# Patient Record
Sex: FEMALE | Race: WHITE | NOT HISPANIC OR LATINO | Employment: FULL TIME | URBAN - METROPOLITAN AREA
[De-identification: names, ages, dates, MRNs, and addresses within clinical notes are randomized per-mention and may not be internally consistent; named-entity substitution may affect disease eponyms.]

---

## 2021-07-15 ENCOUNTER — OFFICE VISIT (OUTPATIENT)
Dept: OBGYN | Facility: CLINIC | Age: 56
End: 2021-07-15
Payer: COMMERCIAL

## 2021-07-15 VITALS
SYSTOLIC BLOOD PRESSURE: 92 MMHG | WEIGHT: 141 LBS | DIASTOLIC BLOOD PRESSURE: 52 MMHG | BODY MASS INDEX: 20.88 KG/M2 | HEIGHT: 69 IN

## 2021-07-15 DIAGNOSIS — N95.2 VAGINAL ATROPHY: ICD-10-CM

## 2021-07-15 DIAGNOSIS — Z01.419 ENCOUNTER FOR GYNECOLOGICAL EXAMINATION WITHOUT ABNORMAL FINDING: Primary | ICD-10-CM

## 2021-07-15 DIAGNOSIS — Z12.4 ENCOUNTER FOR SCREENING FOR CERVICAL CANCER: ICD-10-CM

## 2021-07-15 PROCEDURE — 87624 HPV HI-RISK TYP POOLED RSLT: CPT | Performed by: NURSE PRACTITIONER

## 2021-07-15 PROCEDURE — 99386 PREV VISIT NEW AGE 40-64: CPT | Performed by: NURSE PRACTITIONER

## 2021-07-15 PROCEDURE — G0145 SCR C/V CYTO,THINLAYER,RESCR: HCPCS | Performed by: NURSE PRACTITIONER

## 2021-07-15 RX ORDER — LEVOTHYROXINE SODIUM 100 UG/1
TABLET ORAL
COMMUNITY
Start: 2021-07-13

## 2021-07-15 RX ORDER — CROMOLYN SODIUM 5.2 MG
1 AEROSOL, SPRAY WITH PUMP (ML) NASAL
COMMUNITY
Start: 2019-11-07 | End: 2021-08-19

## 2021-07-15 RX ORDER — KETOCONAZOLE 20 MG/ML
SHAMPOO TOPICAL
COMMUNITY
Start: 2020-04-30

## 2021-07-15 RX ORDER — LORAZEPAM 1 MG/1
TABLET ORAL AT BEDTIME
COMMUNITY
Start: 2021-04-30

## 2021-07-15 RX ORDER — MULTIPLE VITAMINS W/ MINERALS TAB 9MG-400MCG
1 TAB ORAL
COMMUNITY
End: 2024-05-20

## 2021-07-15 RX ORDER — ONDANSETRON 8 MG/1
8 TABLET, FILM COATED ORAL
COMMUNITY
Start: 2020-12-15

## 2021-07-15 RX ORDER — TRIAMCINOLONE ACETONIDE 1 MG/ML
LOTION TOPICAL
COMMUNITY
Start: 2020-05-05

## 2021-07-15 RX ORDER — CITALOPRAM HYDROBROMIDE 40 MG/1
TABLET ORAL
COMMUNITY
Start: 2020-08-19

## 2021-07-15 RX ORDER — ZOLMITRIPTAN 5 MG/1
TABLET, ORALLY DISINTEGRATING ORAL
COMMUNITY
Start: 2021-02-03

## 2021-07-15 RX ORDER — TRETINOIN 1 MG/G
GEL TOPICAL
COMMUNITY
Start: 2020-08-17

## 2021-07-15 ASSESSMENT — ANXIETY QUESTIONNAIRES
2. NOT BEING ABLE TO STOP OR CONTROL WORRYING: NOT AT ALL
3. WORRYING TOO MUCH ABOUT DIFFERENT THINGS: NOT AT ALL
GAD7 TOTAL SCORE: 0
7. FEELING AFRAID AS IF SOMETHING AWFUL MIGHT HAPPEN: NOT AT ALL
5. BEING SO RESTLESS THAT IT IS HARD TO SIT STILL: NOT AT ALL
6. BECOMING EASILY ANNOYED OR IRRITABLE: NOT AT ALL
1. FEELING NERVOUS, ANXIOUS, OR ON EDGE: NOT AT ALL

## 2021-07-15 ASSESSMENT — PATIENT HEALTH QUESTIONNAIRE - PHQ9
SUM OF ALL RESPONSES TO PHQ QUESTIONS 1-9: 0
5. POOR APPETITE OR OVEREATING: NOT AT ALL

## 2021-07-15 ASSESSMENT — MIFFLIN-ST. JEOR: SCORE: 1293.95

## 2021-07-15 NOTE — PROGRESS NOTES
Colette is a 55 year old No obstetric history on file. female who presents for annual exam.     Besides routine health maintenance, she has no other health concerns today.    HPI:  The patient's PCP is No primary care provider on file.  New patient to me here today for her annual GYN exam.  She has not had an exam in a number of years.  She was diagnosed with invasive ductal carcinoma of the right breast.  She has had numerous surgeries last being 2017.  She is postmenopausal and denies any vaginal bleeding.  She does have vaginal dryness and pain with intercourse.  She is not on any hormone replacement therapy at the level of the vagina.      GYNECOLOGIC HISTORY:    No LMP recorded. Patient is postmenopausal.    Her current contraception method is: menopause.  She  reports that she has never smoked. She has never used smokeless tobacco.    Patient is sexually active.  STD testing offered?  Declined    Last PHQ-9 score on record = No flowsheet data found.  Last GAD7 score on record = No flowsheet data found.  Alcohol Score = 2    HEALTH MAINTENANCE:  Cholesterol:   Last Mammo: Not applicable,  Next Mammo: Breast exams with oncologist  Pap:   Colonoscopy: , Result: Normal, 10 years, Next Colonoscopy:   Dexa:  N/A    Health maintenance updated:  no    HISTORY:  OB History    Para Term  AB Living   3 3 3 0 0 3   SAB TAB Ectopic Multiple Live Births   0 0 0 0 0      # Outcome Date GA Lbr Lazaro/2nd Weight Sex Delivery Anes PTL Lv   3 Term            2 Term            1 Term                There is no problem list on file for this patient.    Past Surgical History:   Procedure Laterality Date     C/SECTION, CLASSICAL        Social History     Tobacco Use     Smoking status: Never Smoker     Smokeless tobacco: Never Used   Substance Use Topics     Alcohol use: Not on file      Problem (# of Occurrences) Relation (Name,Age of Onset)    Diabetes (1) Mother            Current Outpatient Medications  "  Medication Sig     citalopram (CELEXA) 40 MG tablet TAKE 1 TABLET(40 MG) BY MOUTH EVERY DAY     cromolyn sodium (NASALCROM) 5.2 MG/ACT nasal aerosol Spray 1 spray in nostril     ketoconazole (NIZORAL) 2 % external shampoo      levothyroxine (SYNTHROID/LEVOTHROID) 100 MCG tablet TAKE 1 TABLET(100 MCG) BY MOUTH EVERY DAY     LORazepam (ATIVAN) 1 MG tablet TAKE 1 TABLET(1 MG) BY MOUTH AT BEDTIME AS NEEDED     multivitamin w/minerals (MULTI-VITAMIN) tablet Take 1 tablet by mouth     ondansetron (ZOFRAN) 8 MG tablet Take 8 mg by mouth     tretinoin microspheres (RETIN-A MICRO) 0.1 % external gel APPLY EXTERNALLY TO THE AFFECTED AREA EVERY DAY     triamcinolone (KENALOG) 0.1 % external lotion      ZOLMitriptan (ZOMIG-ZMT) 5 MG ODT DISSOLVE ONE TABLET ON TONGUE AT ONSET OF HEADACHE. MAY REPEAT AFTER TWO HOURS     No current facility-administered medications for this visit.     Allergies   Allergen Reactions     Other Environmental Allergy      Trees and weeds     Zolpidem      confusion       Past medical, surgical, social and family histories were reviewed and updated in EPIC.    ROS:   12 point review of systems negative other than symptoms noted below or in the HPI.  No urinary frequency or dysuria, bladder or kidney problems    EXAM:  BP 92/52   Ht 1.753 m (5' 9\")   Wt 64 kg (141 lb)   BMI 20.82 kg/m     BMI: Body mass index is 20.82 kg/m .    PHYSICAL EXAM:  Constitutional:   Appearance: Well nourished, well developed, alert, in no acute distress  Neck:  Lymph Nodes:  No lymphadenopathy present    Thyroid:  Gland size normal, nontender, no nodules or masses present  on palpation  Chest:  Respiratory Effort:  Breathing unlabored  Cardiovascular:    Heart: Auscultation:  Regular rate, normal rhythm, no murmurs present  Breasts: Deferred.  Gastrointestinal:   Abdominal Examination:  Abdomen nontender to palpation, tone normal without rigidity or guarding, no masses present, umbilicus without lesions   Liver and " Spleen:  No hepatomegaly present, liver nontender to palpation    Hernias:  No hernias present  Lymphatic: Lymph Nodes:  No other lymphadenopathy present  Skin:  General Inspection:  No rashes present, no lesions present, no areas of  discoloration  Neurologic:    Mental Status:  Oriented X3.  Normal strength and tone, sensory exam                grossly normal, mentation intact and speech normal.    Psychiatric:   Mentation appears normal and affect normal/bright.         Pelvic Exam:  External Genitalia:     Normal appearance for age, no discharge present, no tenderness present, no inflammatory lesions present, color normal  Vagina:     Normal vaginal vault without central or paravaginal defects, ATROPHIC  Bladder:     Nontender to palpation  Urethra:   Urethral Body:  Urethra palpation normal, urethra structural support normal   Urethral Meatus:  No erythema or lesions present  Cervix:     Appearance healthy, no lesions present, nontender to palpation, no bleeding present  Uterus:     Nontender to palpation, no masses present, position anteflexed, mobility: normal  Adnexa:     No adnexal tenderness present, no adnexal masses present  Perineum:     Perineum within normal limits, no evidence of trauma, no rashes or skin lesions present  Inguinal Lymph Nodes:     No lymphadenopathy present      COUNSELING:   Special attention given to:        Regular exercise       Healthy diet/nutrition       Osteoporosis prevention/bone health       Colon cancer screening    BMI: Body mass index is 20.82 kg/m .      ASSESSMENT:  55 year old female with satisfactory annual exam.    ICD-10-CM    1. Encounter for gynecological examination without abnormal finding  Z01.419    2. Encounter for screening for cervical cancer   Z12.4 Pap imaged thin layer screen with HPV - recommended age 30 - 65 years   3. Vaginal atrophy  N95.2        PLAN:  Normal PM gyn exam with vaginal atrophy noted. Both hormonal and non-hormonal methods were  discussed with her. She will discuss with oncology-use of testosterone and vaginal E2 cream for dyspareunia. Pap was updated    STEPHAN Cole CNP

## 2021-07-16 ASSESSMENT — ANXIETY QUESTIONNAIRES: GAD7 TOTAL SCORE: 0

## 2021-07-19 LAB
BKR LAB AP GYN ADEQUACY: NORMAL
BKR LAB AP GYN INTERPRETATION: NORMAL
BKR LAB AP HPV REFLEX: NORMAL
BKR LAB AP PREVIOUS ABNORMAL: NORMAL
PATH REPORT.COMMENTS IMP SPEC: NORMAL
PATH REPORT.RELEVANT HX SPEC: NORMAL

## 2021-07-22 ENCOUNTER — PATIENT OUTREACH (OUTPATIENT)
Dept: OBGYN | Facility: CLINIC | Age: 56
End: 2021-07-22

## 2021-07-22 LAB
HUMAN PAPILLOMA VIRUS 16 DNA: NEGATIVE
HUMAN PAPILLOMA VIRUS 18 DNA: POSITIVE
HUMAN PAPILLOMA VIRUS FINAL DIAGNOSIS: NORMAL
HUMAN PAPILLOMA VIRUS OTHER HR: NEGATIVE

## 2021-08-18 PROBLEM — Z90.13 ACQUIRED ABSENCE OF BILATERAL BREASTS AND NIPPLES: Status: ACTIVE | Noted: 2017-02-27

## 2021-08-18 PROBLEM — H54.3 BLINDNESS, BILATERAL: Status: ACTIVE | Noted: 2017-09-26

## 2021-08-18 PROBLEM — N61.0 INFECTION OF BREAST, RIGHT: Status: ACTIVE | Noted: 2017-02-15

## 2021-08-18 RX ORDER — VALACYCLOVIR HYDROCHLORIDE 1 G/1
2000 TABLET, FILM COATED ORAL PRN
COMMUNITY
Start: 2021-07-16

## 2021-08-18 RX ORDER — CODEINE/BUTALBITAL/ASA/CAFFEIN 30-50-325
CAPSULE ORAL
COMMUNITY
Start: 2021-07-16 | End: 2024-05-20

## 2021-08-18 NOTE — PROGRESS NOTES
INDICATIONS:                                                    Is a pregnancy test required: No.  Was a consent obtained?  Yes    Today's PHQ-2 Score:   PHQ-2 ( 1999 [employer]) 7/15/2021   Q1: Little interest or pleasure in doing things 0   Q2: Feeling down, depressed or hopeless 0   PHQ-2 Score 0     Today's PHQ-9 Score:    PHQ-9 SCORE 7/15/2021   PHQ-9 Total Score 0     Today's GAD7 Score:     Colette Nassar, is a 55 year old female, who had a recent NILM pap.  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 Here today for colposcopy. Discussed indication, risks of infection and bleeding.    Pap history:  11/18/14 NIL  7/15/21 NIL pap, +HR HPV 18. Plan: colposcopy due before 10/15/21    Has hx of genital warts when she was younger. Maybe a abnormal pap in 20's.   Denies hx of leep/cone or colposcopy.  Reviewed care everywhere, no additonal paps found other than what documented above.     Hx triple neg breast ca 2014.       PROCEDURE:                                                      Cervix is stained with acetic acid and viewed colposcopically. Squamocolumnar junction is visualized in it's entirety. Ectropion noted. Pt denies hx of prior LEEP, though cervix with similar appearance as post leep cervix. Several scattered vessels noted 9-1, 3-7. Biopsy done Yes. Endocervical curretage Not Done       POST PROCEDURE:                                                      IMPRESSION: Patient tolerated procedure well and colposcopy adequate  Pt without adequately documented history of cervical cancer screening. 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    PLAN : Await the results of the biopsies.  She tolerated the procedure well. There were no complications. Patient was discharged in stable condition.    Patient advised to call the clinic if excessive bleeding, pelvic pain, or fever.     Follow-up based on pathology results.    Discussed HPV-natural history, transmission, precancerous and cancerous changes, prevention and treatment,  cervical cancer screening guidelines, carrier states/recurrence and her individual test history. Additionally discussed ways to ensure healthy immune system for best chances of fighting off virus such as sleep, exercise, nutrition, and avoidance of tobacco products. Reviewed specifics of procedure, aftercare and notification of results. Questions answered.     Vaginal hypoestrogenism, vaginal dryness and dyspareunia. Discussed therapies available for dryness and comfort with sex. Discussed how to use and meds available. Is most interested in the ring. Rx given. Pt will research cost and will call back for prescription change if desired.   No identifiable contraindications for estrogen.   Hx breast ca triple neg.    (15 minutes was spent on date of encounter discussing her history, diagnosis, and follow-up plan, treatment options, in documentation and chart review as noted above in addition to procedure. )    Sherry Saavedra Masters, DO

## 2021-08-19 ENCOUNTER — OFFICE VISIT (OUTPATIENT)
Dept: OBGYN | Facility: CLINIC | Age: 56
End: 2021-08-19
Payer: COMMERCIAL

## 2021-08-19 VITALS
HEIGHT: 69 IN | DIASTOLIC BLOOD PRESSURE: 60 MMHG | WEIGHT: 143.7 LBS | BODY MASS INDEX: 21.28 KG/M2 | SYSTOLIC BLOOD PRESSURE: 122 MMHG

## 2021-08-19 DIAGNOSIS — N95.2 ATROPHIC VAGINITIS: ICD-10-CM

## 2021-08-19 DIAGNOSIS — N89.8 VAGINAL DRYNESS: ICD-10-CM

## 2021-08-19 DIAGNOSIS — R87.810 CERVICAL HIGH RISK HPV (HUMAN PAPILLOMAVIRUS) TEST POSITIVE: Primary | ICD-10-CM

## 2021-08-19 PROCEDURE — 57455 BIOPSY OF CERVIX W/SCOPE: CPT | Performed by: OBSTETRICS & GYNECOLOGY

## 2021-08-19 PROCEDURE — 99213 OFFICE O/P EST LOW 20 MIN: CPT | Mod: 25 | Performed by: OBSTETRICS & GYNECOLOGY

## 2021-08-19 PROCEDURE — 88305 TISSUE EXAM BY PATHOLOGIST: CPT | Performed by: PATHOLOGY

## 2021-08-19 ASSESSMENT — MIFFLIN-ST. JEOR: SCORE: 1306.2

## 2021-08-23 LAB
PATH REPORT.COMMENTS IMP SPEC: NORMAL
PATH REPORT.COMMENTS IMP SPEC: NORMAL
PATH REPORT.FINAL DX SPEC: NORMAL
PATH REPORT.GROSS SPEC: NORMAL
PATH REPORT.MICROSCOPIC SPEC OTHER STN: NORMAL
PATH REPORT.RELEVANT HX SPEC: NORMAL
PHOTO IMAGE: NORMAL

## 2021-08-24 ENCOUNTER — TELEPHONE (OUTPATIENT)
Dept: OBGYN | Facility: CLINIC | Age: 56
End: 2021-08-24

## 2021-08-24 NOTE — TELEPHONE ENCOUNTER
Prior Authorization Retail Medication Request    Medication/Dose: estradiol (ESTRING) 2 MG vaginal ring  ICD code (if different than what is on RX):    Previously Tried and Failed:  N/A  Rationale:  Initiation of hormone replacement therapy    Office Visit 8/19/21  Vaginal hypoestrogenism, vaginal dryness and dyspareunia. Discussed therapies available for dryness and comfort with sex. Discussed how to use and meds available. Is most interested in the ring.     Insurance Name:  [employer] [place]  Insurance ID:  [id number]

## 2021-08-24 NOTE — TELEPHONE ENCOUNTER
Estring is not covered. The covered options are:      PREMARIN VAGINAL CREAM W/APPL 0.625 M   ESTRADIOL VAG CRM W/APPL 42.5 0.01%   ESTRADIOL TABS 0.5MG   ESTRADIOL TABS 1MG   ESTRADIOL TABS 2MG   PREMARIN VAGINAL CREAM W/APPL 0.625 M   ESTRADIOL VAG CRM W/APPL 42.5 0.01%    Can patient switch to one of these medications or continue with a PA?    Please adviseCherelle Prior Authorization Team  Phone: [phone]

## 2021-08-26 NOTE — TELEPHONE ENCOUNTER
PRIOR AUTHORIZATION DENIED    Medication: estradiol (ESTRING) 2 MG vaginal ring- DENIED    Denial Date: 8/26/2021    Denial Rational: HAS NOT TRIED 2 PREFERRED ALTERNATIVES          Appeal Information:       SEE APPEAL FORM IN DENIAL LETTER      Central Prior Authorization Team  Phone: [phone]

## 2021-08-26 NOTE — TELEPHONE ENCOUNTER
Patient has 2 options.  She can pay for it out-of-pocket, or outside of insurance, not using [email or web address].  Or she can try a vaginal cream or Vagifem tablet.    Sherry Saavedra Masters, DO

## 2021-08-26 NOTE — TELEPHONE ENCOUNTER
PA Initiation    Medication: estradiol (ESTRING) 2 MG vaginal ring- INITIATED  Insurance Company: [employer] - Phone [phone] Fax [phone]  Pharmacy Filling the Rx: [id number]  [place] - [address]  Filling Pharmacy Phone: [phone]  Filling Pharmacy Fax: [phone]  Start Date: 8/24/2021

## 2021-08-27 NOTE — TELEPHONE ENCOUNTER
Called pt and gave options on voice mail.  Call back and let us know how she wishes to proceed.    Lyssa Donnelly RN on 8/27/2021 at 9:10 AM

## 2021-09-03 ENCOUNTER — PATIENT OUTREACH (OUTPATIENT)
Dept: OBGYN | Facility: CLINIC | Age: 56
End: 2021-09-03

## 2021-09-09 NOTE — TELEPHONE ENCOUNTER
Left detailed vm to review options - no word so not sure what she decided. Can call and give update or if just sticking w estring out of pocket, that's fine too.    Lyssa Donnelly RN on 9/9/2021 at 3:17 PM

## 2021-10-24 ENCOUNTER — HEALTH MAINTENANCE LETTER (OUTPATIENT)
Age: 56
End: 2021-10-24

## 2022-03-08 ENCOUNTER — TRANSCRIBE ORDERS (OUTPATIENT)
Dept: OTHER | Age: 57
End: 2022-03-08
Payer: COMMERCIAL

## 2022-03-08 DIAGNOSIS — M54.2 NECK PAIN: Primary | ICD-10-CM

## 2022-08-01 ENCOUNTER — PATIENT OUTREACH (OUTPATIENT)
Dept: OBGYN | Facility: CLINIC | Age: 57
End: 2022-08-01

## 2022-08-01 DIAGNOSIS — R87.810 CERVICAL HIGH RISK HPV (HUMAN PAPILLOMAVIRUS) TEST POSITIVE: ICD-10-CM

## 2022-08-01 NOTE — LETTER
August 1, 2022      Colette Nassar  [address]  [place] [address]        Dear ,    This letter is to remind you that you are due for your follow-up Pap smear and Human Papillomavirus (HPV) test.    Please call [phone] to schedule your appointment at your earliest convenience.    If you have completed the appointment outside of the [facility] system, please have the records forwarded to our office. We will update your chart for your provider to review before your next annual wellness visit.     Thank you for choosing [facility]!      Sincerely,    Your [facility] Care Team

## 2022-09-08 ENCOUNTER — OFFICE VISIT (OUTPATIENT)
Dept: OBGYN | Facility: CLINIC | Age: 57
End: 2022-09-08
Payer: COMMERCIAL

## 2022-09-08 VITALS
HEIGHT: 69 IN | BODY MASS INDEX: 23.25 KG/M2 | DIASTOLIC BLOOD PRESSURE: 74 MMHG | SYSTOLIC BLOOD PRESSURE: 102 MMHG | WEIGHT: 157 LBS

## 2022-09-08 DIAGNOSIS — R87.810 CERVICAL HIGH RISK HPV (HUMAN PAPILLOMAVIRUS) TEST POSITIVE: ICD-10-CM

## 2022-09-08 DIAGNOSIS — Z01.419 ENCOUNTER FOR GYNECOLOGICAL EXAMINATION WITHOUT ABNORMAL FINDING: Primary | ICD-10-CM

## 2022-09-08 DIAGNOSIS — N95.2 VAGINAL ATROPHY: ICD-10-CM

## 2022-09-08 DIAGNOSIS — C50.911 INVASIVE DUCTAL CARCINOMA OF BREAST, FEMALE, RIGHT (H): ICD-10-CM

## 2022-09-08 LAB — CANCER AG125 SERPL-ACNC: 18 U/ML

## 2022-09-08 PROCEDURE — 87624 HPV HI-RISK TYP POOLED RSLT: CPT | Performed by: NURSE PRACTITIONER

## 2022-09-08 PROCEDURE — 99396 PREV VISIT EST AGE 40-64: CPT | Performed by: NURSE PRACTITIONER

## 2022-09-08 PROCEDURE — G0145 SCR C/V CYTO,THINLAYER,RESCR: HCPCS | Performed by: NURSE PRACTITIONER

## 2022-09-08 PROCEDURE — 36415 COLL VENOUS BLD VENIPUNCTURE: CPT | Performed by: NURSE PRACTITIONER

## 2022-09-08 PROCEDURE — 86304 IMMUNOASSAY TUMOR CA 125: CPT | Performed by: NURSE PRACTITIONER

## 2022-09-08 RX ORDER — ESTRADIOL 0.1 MG/G
1 CREAM VAGINAL AT BEDTIME
Qty: 42.5 G | Refills: 3 | Status: SHIPPED | OUTPATIENT
Start: 2022-09-08

## 2022-09-08 ASSESSMENT — ANXIETY QUESTIONNAIRES
1. FEELING NERVOUS, ANXIOUS, OR ON EDGE: SEVERAL DAYS
7. FEELING AFRAID AS IF SOMETHING AWFUL MIGHT HAPPEN: NOT AT ALL
2. NOT BEING ABLE TO STOP OR CONTROL WORRYING: NOT AT ALL
6. BECOMING EASILY ANNOYED OR IRRITABLE: SEVERAL DAYS
5. BEING SO RESTLESS THAT IT IS HARD TO SIT STILL: NOT AT ALL
GAD7 TOTAL SCORE: 2
GAD7 TOTAL SCORE: 2
3. WORRYING TOO MUCH ABOUT DIFFERENT THINGS: NOT AT ALL
IF YOU CHECKED OFF ANY PROBLEMS ON THIS QUESTIONNAIRE, HOW DIFFICULT HAVE THESE PROBLEMS MADE IT FOR YOU TO DO YOUR WORK, TAKE CARE OF THINGS AT HOME, OR GET ALONG WITH OTHER PEOPLE: NOT DIFFICULT AT ALL

## 2022-09-08 ASSESSMENT — PATIENT HEALTH QUESTIONNAIRE - PHQ9
5. POOR APPETITE OR OVEREATING: NOT AT ALL
SUM OF ALL RESPONSES TO PHQ QUESTIONS 1-9: 0

## 2022-09-08 NOTE — PROGRESS NOTES
Colette is a 56 year old  female who presents for annual exam.     Besides routine health maintenance,  she would like to discuss HRT.    HPI:  The patient's PCP is No primary care provider on file.  Patient here today for her annual GYN exam.  She needs no further mammogram screening as she has had a bilateral mastectomy.  She does have a history of right breast cancer.  She states that she was negative for BRCA genes but she was positive for a BA RD 1 variant which we now know is linked to ovarian malignancies.  She has full retention of uterus cervix and ovaries.  She has questions about a risk reducing hysterectomy.    She is postmenopausal and has avoidance dyspareunia at this time.  We discussed this last year and started her on the vaginal ring but it was not financially feasible.  She is open to other options at this time.      GYNECOLOGIC HISTORY:    No LMP recorded. Patient is postmenopausal.    Her current contraception method is: menopause.  She  reports that she has never smoked. She has never used smokeless tobacco.    Patient is sexually active.  STD testing offered?  Declined     Last PHQ-9 score on record =   PHQ-9 SCORE 2022   PHQ-9 Total Score 0     Last GAD7 score on record =   AMISHA-7 SCORE 2022   Total Score 2     Alcohol Score = 1    HEALTH MAINTENANCE:  Cholesterol: 21  EGPY=116, TRIGLYCERIDES=58, HDL=78, MJY=973.  Last Mammo: Not applicable, Next Mammo: Breast exams with oncologist.  Pap:  Lab Results   Component Value Date    GYNINTERP  07/15/2021     Negative for Intraepithelial Lesion or Malignancy (NILM)      Colonoscopy: , Result: Normal, Next Colonoscopy:   Dexa:  N/A    Health maintenance updated:  yes    HISTORY:  OB History    Para Term  AB Living   3 3 3 0 0 3   SAB IAB Ectopic Multiple Live Births   0 0 0 0 0      # Outcome Date GA Lbr Lazaro/2nd Weight Sex Delivery Anes PTL Lv   3 Term            2 Term            1 Term                 Patient Active Problem List   Diagnosis     Cervical high risk HPV (human papillomavirus) test positive     Abnormal weight gain     Acquired absence of bilateral breasts and nipples     Blindness, bilateral     Bruising     Coordination impairment     Elbow subluxation, left     Encounter for health-related screening     Exercise induced bronchospasm     Fatigue     Hypothyroidism     Infection of breast, right     Invasive ductal carcinoma of breast, female, right (H)     Lack of concentration     Left shoulder pain     Memory loss     Menopausal syndrome     Migraine     Mild recurrent major depression (H)     Perimenopause     SLAP lesion of left shoulder     Venous (peripheral) insufficiency     Vitamin D deficiency     Past Surgical History:   Procedure Laterality Date     C/SECTION, CLASSICAL        Social History     Tobacco Use     Smoking status: Never Smoker     Smokeless tobacco: Never Used   Substance Use Topics     Alcohol use: Not on file      Problem (# of Occurrences) Relation (Name,Age of Onset)    Diabetes (1) Mother            Current Outpatient Medications   Medication Sig     butalbital-aspirin-caffeine-codeine (FIORINAL WITH CODEINE) per capsule TAKE 1 TO 2 CAPSULES BY MOUTH TWICE DAILY AS NEEDED     citalopram (CELEXA) 40 MG tablet TAKE 1 TABLET(40 MG) BY MOUTH EVERY DAY     estradiol (ESTRACE) 0.1 MG/GM vaginal cream Place 1 g vaginally At Bedtime For 30 nights, then 3 times per week thereafter. Use finger for application.     ketoconazole (NIZORAL) 2 % external shampoo      levothyroxine (SYNTHROID/LEVOTHROID) 100 MCG tablet TAKE 1 TABLET(100 MCG) BY MOUTH EVERY DAY     LORazepam (ATIVAN) 1 MG tablet At Bedtime     multivitamin w/minerals (THERA-VIT-M) tablet Take 1 tablet by mouth     ondansetron (ZOFRAN) 8 MG tablet Take 8 mg by mouth     tretinoin microspheres (RETIN-A MICRO) 0.1 % external gel APPLY EXTERNALLY TO THE AFFECTED AREA EVERY DAY     triamcinolone (KENALOG) 0.1 %  "external lotion      valACYclovir (VALTREX) 1000 mg tablet Take 2,000 mg by mouth as needed     ZOLMitriptan (ZOMIG-ZMT) 5 MG ODT DISSOLVE ONE TABLET ON TONGUE AT ONSET OF HEADACHE. MAY REPEAT AFTER TWO HOURS     estradiol (ESTRING) 2 MG vaginal ring Place 1 each vaginally every 3 months (Patient not taking: Reported on 9/8/2022)     No current facility-administered medications for this visit.     Allergies   Allergen Reactions     Other Environmental Allergy      Trees and weeds     Zolpidem      confusion       Past medical, surgical, social and family histories were reviewed and updated in EPIC.    ROS:   12 point review of systems negative other than symptoms noted below or in the HPI.  No urinary frequency or dysuria, bladder or kidney problems, POSITIVE for:, dysparunia    EXAM:  /74   Ht 1.753 m (5' 9\")   Wt 71.2 kg (157 lb)   BMI 23.18 kg/m     BMI: Body mass index is 23.18 kg/m .    PHYSICAL EXAM:  Constitutional:   Appearance: Well nourished, well developed, alert, in no acute distress  Neck:  Lymph Nodes:  No lymphadenopathy present    Thyroid:  Gland size normal, nontender, no nodules or masses present  on palpation  Chest:  Respiratory Effort:  Breathing unlabored  Cardiovascular:    Heart: Auscultation:  Regular rate, normal rhythm, no murmurs present  Breasts: Status post bilateral mastectomy.  Gastrointestinal:   Abdominal Examination:  Abdomen nontender to palpation, tone normal without rigidity or guarding, no masses present, umbilicus without lesions   Liver and Spleen:  No hepatomegaly present, liver nontender to palpation    Hernias:  No hernias present  Lymphatic: Lymph Nodes:  No other lymphadenopathy present  Skin:  General Inspection:  No rashes present, no lesions present, no areas of  discoloration  Neurologic:    Mental Status:  Oriented X3.  Normal strength and tone, sensory exam                grossly normal, mentation intact and speech normal.    Psychiatric:   Mentation " appears normal and affect normal/bright.         Pelvic Exam:  External Genitalia:     Normal appearance for age, no discharge present, no tenderness present, no inflammatory lesions present, color normal.  Atrophic at introitus  Vagina:     Normal vaginal vault without central or paravaginal defects, ATROPHIC  Bladder:     Nontender to palpation  Urethra:   Urethral Body:  Urethra palpation normal, urethra structural support normal   Urethral Meatus:  No erythema or lesions present  Cervix:     Appearance erythematous, no lesions present, nontender to palpation, no bleeding present  Uterus:     Nontender to palpation, no masses present, position anteflexed, mobility: normal  Adnexa:     No adnexal tenderness present, no adnexal masses present  Perineum:     Perineum within normal limits, no evidence of trauma, no rashes or skin lesions present  Inguinal Lymph Nodes:     No lymphadenopathy present      COUNSELING:   Special attention given to:        Regular exercise       Healthy diet/nutrition       ([place])menopause management    BMI: Body mass index is 23.18 kg/m .      ASSESSMENT:  56 year old female with satisfactory annual exam.    ICD-10-CM    1. Encounter for gynecological examination without abnormal finding  Z01.419    2. Cervical high risk HPV (human papillomavirus) test positive  R87.810 Pap screen with HPV - recommended age 30 - 65 years   3. Invasive ductal carcinoma of breast, female, right (H)  C50.911      US Transvaginal Pelvic Non-OB        4. Vaginal atrophy  N95.2 estradiol (ESTRACE) 0.1 MG/GM vaginal cream       PLAN:  Thin postmenopausal female with vaginal atrophy on exam.  We encouraged her to try the vaginal estrogen cream.  We also encouraged her to use a sexual lubricant.  I do not feel as if she needs vaginal dilators at this time.  Pap smear was collected and if it is normal she can repeat in 1 year.  We will obtain a  today and have her come back for a transvaginal  ultrasound and see one of the surgeons to discuss a risk reducing hysterectomy.    STEPHAN Cole CNP

## 2022-09-13 LAB
BKR LAB AP GYN ADEQUACY: NORMAL
BKR LAB AP GYN INTERPRETATION: NORMAL
BKR LAB AP HPV REFLEX: NORMAL
BKR LAB AP PREVIOUS ABNL DX: NORMAL
BKR LAB AP PREVIOUS ABNORMAL: NORMAL
PATH REPORT.COMMENTS IMP SPEC: NORMAL
PATH REPORT.COMMENTS IMP SPEC: NORMAL
PATH REPORT.RELEVANT HX SPEC: NORMAL

## 2022-09-15 LAB
HUMAN PAPILLOMA VIRUS 16 DNA: NEGATIVE
HUMAN PAPILLOMA VIRUS 18 DNA: NEGATIVE
HUMAN PAPILLOMA VIRUS FINAL DIAGNOSIS: NORMAL
HUMAN PAPILLOMA VIRUS OTHER HR: NEGATIVE

## 2022-09-16 ENCOUNTER — PATIENT OUTREACH (OUTPATIENT)
Dept: OBGYN | Facility: CLINIC | Age: 57
End: 2022-09-16

## 2022-09-16 DIAGNOSIS — R87.810 CERVICAL HIGH RISK HPV (HUMAN PAPILLOMAVIRUS) TEST POSITIVE: ICD-10-CM

## 2022-10-15 ENCOUNTER — HEALTH MAINTENANCE LETTER (OUTPATIENT)
Age: 57
End: 2022-10-15

## 2023-04-25 ENCOUNTER — MYC MEDICAL ADVICE (OUTPATIENT)
Dept: OBGYN | Facility: CLINIC | Age: 58
End: 2023-04-25
Payer: COMMERCIAL

## 2023-04-25 NOTE — TELEPHONE ENCOUNTER
Panel Management Review    Date of last visit with a [facility] provider: Lyssa Cleveland on 09/08/22.  Date of next visit with a [facility] provider: None.    Health Maintenance List    Health Maintenance   Topic Date Due     TSH W/FREE T4 REFLEX  Never done     ASTHMA ACTION PLAN  Never done     ASTHMA CONTROL TEST  Never done     ADVANCE CARE PLANNING  Never done     DEPRESSION ACTION PLAN  Never done     HEPATITIS B IMMUNIZATION (1 of 3 - 3-dose series) Never done     COVID-19 Vaccine (1) Never done     COLORECTAL CANCER SCREENING  Never done     HIV SCREENING  Never done     HEPATITIS C SCREENING  Never done     LIPID  Never done     INFLUENZA VACCINE (1) 09/01/2022     PHQ-9  03/08/2023     ZOSTER IMMUNIZATION (2 of 2) 06/06/2022     YEARLY PREVENTIVE VISIT  09/08/2023     PAP FOLLOW-UP  09/08/2023     HPV FOLLOW-UP  09/08/2023     DTAP/TDAP/TD IMMUNIZATION (3 - Td or Tdap) 02/28/2032     Pneumococcal Vaccine: Pediatrics (0 to 5 Years) and At-Risk Patients (6 to 64 Years)  Aged Out     IPV IMMUNIZATION  Aged Out     MENINGITIS IMMUNIZATION  Aged Out     PAP  Discontinued       Composite cancer screening  Chart review shows that this patient is due/due soon for the following Colonoscopy  No results found for: PAP  Past Surgical History:   Procedure Laterality Date     C/SECTION, CLASSICAL       Summary:    Patient is due/failing the following:   Colonoscopy    Action needed: Patient needs referral/order    Type of outreach:  Sent [employer] message.      Staff Signature:  Sherry Samano LPN on 4/25/2023 at 10:10 AM

## 2023-07-26 ENCOUNTER — OFFICE VISIT (OUTPATIENT)
Dept: ALLERGY | Facility: OTHER | Age: 58
End: 2023-07-26
Payer: COMMERCIAL

## 2023-07-26 VITALS
BODY MASS INDEX: 24.42 KG/M2 | OXYGEN SATURATION: 99 % | SYSTOLIC BLOOD PRESSURE: 104 MMHG | WEIGHT: 165.34 LBS | DIASTOLIC BLOOD PRESSURE: 71 MMHG | TEMPERATURE: 98 F

## 2023-07-26 DIAGNOSIS — J30.0 CHRONIC VASOMOTOR RHINITIS: ICD-10-CM

## 2023-07-26 DIAGNOSIS — T78.49XA OTHER ALLERGY, INITIAL ENCOUNTER: Primary | ICD-10-CM

## 2023-07-26 PROCEDURE — 36415 COLL VENOUS BLD VENIPUNCTURE: CPT | Performed by: ALLERGY & IMMUNOLOGY

## 2023-07-26 PROCEDURE — 82785 ASSAY OF IGE: CPT | Performed by: ALLERGY & IMMUNOLOGY

## 2023-07-26 PROCEDURE — 99204 OFFICE O/P NEW MOD 45 MIN: CPT | Performed by: ALLERGY & IMMUNOLOGY

## 2023-07-26 PROCEDURE — 86003 ALLG SPEC IGE CRUDE XTRC EA: CPT | Performed by: ALLERGY & IMMUNOLOGY

## 2023-07-26 RX ORDER — EPINEPHRINE 0.3 MG/.3ML
0.3 INJECTION SUBCUTANEOUS PRN
Qty: 2 EACH | Refills: 3 | Status: SHIPPED | OUTPATIENT
Start: 2023-07-26 | End: 2024-05-20

## 2023-07-26 RX ORDER — AZELASTINE 1 MG/ML
2 SPRAY, METERED NASAL 2 TIMES DAILY PRN
Qty: 30 ML | Refills: 3 | Status: SHIPPED | OUTPATIENT
Start: 2023-07-26 | End: 2024-05-20

## 2023-07-26 RX ORDER — MOMETASONE FUROATE MONOHYDRATE 50 UG/1
1-2 SPRAY, METERED NASAL DAILY
Qty: 17 G | Refills: 3 | Status: SHIPPED | OUTPATIENT
Start: 2023-07-26 | End: 2024-05-20

## 2023-07-26 ASSESSMENT — ENCOUNTER SYMPTOMS
ADENOPATHY: 0
CHEST TIGHTNESS: 0
NERVOUS/ANXIOUS: 0
LIGHT-HEADEDNESS: 0
ACTIVITY CHANGE: 0
SHORTNESS OF BREATH: 0
FEVER: 0
CHILLS: 0
EYE DISCHARGE: 0
NAUSEA: 0
EYE ITCHING: 0
CONSTIPATION: 0
ABDOMINAL PAIN: 0
COUGH: 0
DIZZINESS: 0
SINUS PRESSURE: 0
JOINT SWELLING: 0
EYE REDNESS: 0
SORE THROAT: 0
RHINORRHEA: 0
WHEEZING: 0
VOMITING: 0
FATIGUE: 0
DIARRHEA: 0
HEADACHES: 1

## 2023-07-26 NOTE — PROGRESS NOTES
SUBJECTIVE:                                                                   Colette Nassar presents today to our Allergy Clinic at [facility] for a new patient visit. She is a 57-year-old female with concerns about possible shellfish allergy.   Reports a history of seasonal allergies. In September she develops nasal congestion, rhinorrhea, sneezing, postnasal drip, ear itchy, and itchy and watery eyes.   In February, she develops sinus infections as well.     She did not like using intranasal fluticasone. Most of the time, she treats her symptoms with oxymetazoline. Sometimes, she develops rhinitis medicamentosa symptoms, and she needs to take prednisone for that.  She tried Astelin a long time ago. She doesn't remember if it was effective.     She used to eat shellfish once a month without a problem. In February 2023, she developed pruritic beet red skin in the morning. She thinks she developed itchy skin in the evening prior but is not 100% sure. That evening, she was with her mother at [employer], eating either shrimp or crab. The rash persisted for weeks despite taking prednisone and diphenhydramine. She denies having any lip swelling, tongue swelling, or throat closing sensation simultaneously. She has prednisone in [place]. When the rash was getting better, it was somewhat scaly.  She does have a picture of the rash on her neck. It did not appear urticarial. It resembled dermatitis.  The same story happened in May 2023. She is not sure if she ate any shellfish, in between. She has been avoiding shellfish after the second episode.       Patient Active Problem List   Diagnosis    Cervical high risk HPV (human papillomavirus) test positive    Abnormal weight gain    Acquired absence of bilateral breasts and nipples    Blindness, bilateral    Bruising    Coordination impairment    Elbow subluxation, left    Encounter for health-related screening    Exercise induced bronchospasm     Fatigue    Hypothyroidism    Infection of breast, right    Invasive ductal carcinoma of breast, female, right (H)    Lack of concentration    Left shoulder pain    Memory loss    Menopausal syndrome    Migraine    Mild recurrent major depression (H)    Perimenopause    SLAP lesion of left shoulder    Venous (peripheral) insufficiency    Vitamin D deficiency       Past Medical History:   Diagnosis Date    Breast cancer (H)     Cervical high risk HPV (human papillomavirus) test positive 7/15/2021    11/18/14 NIL 7/15/21 NIL pap, +HR HPV 18. Plan: colposcopy due before 10/15/21    Melanoma of skin (H)       Problem (# of Occurrences) Relation (Name,Age of Onset)    Diabetes (1) Mother          Past Surgical History:   Procedure Laterality Date    C/SECTION, CLASSICAL       Social History     Socioeconomic History    Marital status:      Spouse name: None    Number of children: None    Years of education: None    Highest education level: None   Tobacco Use    Smoking status: Never    Smokeless tobacco: Never   Substance and Sexual Activity    Sexual activity: Yes     Partners: Male     Birth control/protection: Post-menopausal   Social History Narrative    July 26, 2023    ENVIRONMENTAL HISTORY: [place] The [place] is heated with a forced air. They does have central air conditioning. The patient's bedroom is furnished with carpeting in bedroom, allergen mattress cover, and fabric window coverings.  Pets inside the house include 1 cat(s). There is no history of cockroach or mice infestation. There is/are 0 smokers in the house.  The house does not have a damp basement.            Review of Systems   Constitutional:  Negative for activity change, chills, fatigue and fever.   HENT:  Positive for sneezing. Negative for congestion, nosebleeds, postnasal drip, rhinorrhea, sinus pressure and sore throat.    Eyes:  Negative for discharge, redness and itching.   Respiratory:   Negative for cough, chest tightness, shortness of breath and wheezing.    Cardiovascular:  Negative for chest pain.   Gastrointestinal:  Negative for abdominal pain, constipation, diarrhea, nausea and vomiting.   Musculoskeletal:  Negative for joint swelling.   Skin:  Negative for rash.   Allergic/Immunologic: Positive for environmental allergies and food allergies.   Neurological:  Positive for headaches. Negative for dizziness and light-headedness.   Hematological:  Negative for adenopathy.   Psychiatric/Behavioral:  Negative for behavioral problems. The patient is not nervous/anxious.            Current Outpatient Medications:     azelastine (ASTELIN) 0.1 % nasal spray, Spray 2 sprays into both nostrils 2 times daily as needed for rhinitis, Disp: 30 mL, Rfl: 3    azelastine (ASTELIN) 0.1 % nasal spray, Spray 2 sprays into both nostrils 2 times daily as needed for rhinitis, Disp: 30 mL, Rfl: 3    EPINEPHrine (ANY BX GENERIC EQUIV) 0.3 MG/0.3ML injection 2-pack, Inject 0.3 mLs (0.3 mg) into the muscle as needed for anaphylaxis May repeat one time in 5-15 minutes if response to initial dose is inadequate., Disp: 2 each, Rfl: 3    mometasone (NASONEX) 50 MCG/ACT nasal spray, Spray 1-2 sprays into both nostrils daily, Disp: 17 g, Rfl: 3    butalbital-aspirin-caffeine-codeine (FIORINAL WITH CODEINE) per capsule, TAKE 1 TO 2 CAPSULES BY MOUTH TWICE DAILY AS NEEDED, Disp: , Rfl:     citalopram (CELEXA) 40 MG tablet, TAKE 1 TABLET(40 MG) BY MOUTH EVERY DAY, Disp: , Rfl:     estradiol (ESTRACE) 0.1 MG/GM vaginal cream, Place 1 g vaginally At Bedtime For 30 nights, then 3 times per week thereafter. Use finger for application., Disp: 42.5 g, Rfl: 3    estradiol (ESTRING) 2 MG vaginal ring, Place 1 each vaginally every 3 months (Patient not taking: Reported on 9/8/2022), Disp: 1 each, Rfl: 4    ketoconazole (NIZORAL) 2 % external shampoo, , Disp: , Rfl:     levothyroxine (SYNTHROID/LEVOTHROID) 100 MCG tablet, TAKE 1  TABLET(100 MCG) BY MOUTH EVERY DAY, Disp: , Rfl:     LORazepam (ATIVAN) 1 MG tablet, At Bedtime, Disp: , Rfl:     multivitamin w/minerals (THERA-VIT-M) tablet, Take 1 tablet by mouth, Disp: , Rfl:     ondansetron (ZOFRAN) 8 MG tablet, Take 8 mg by mouth, Disp: , Rfl:     tretinoin microspheres (RETIN-A MICRO) 0.1 % external gel, APPLY EXTERNALLY TO THE AFFECTED AREA EVERY DAY, Disp: , Rfl:     triamcinolone (KENALOG) 0.1 % external lotion, , Disp: , Rfl:     valACYclovir (VALTREX) 1000 mg tablet, Take 2,000 mg by mouth as needed, Disp: , Rfl:     ZOLMitriptan (ZOMIG-ZMT) 5 MG ODT, DISSOLVE ONE TABLET ON TONGUE AT ONSET OF HEADACHE. MAY REPEAT AFTER TWO HOURS, Disp: , Rfl:   Immunization History   Administered Date(s) Administered    Flu, Unspecified 10/17/2017, 10/01/2019    Influenza (IIV3) PF 10/22/2010, 10/17/2011, 10/05/2012, 10/14/2013, 10/13/2014    Influenza Vaccine >6 months (Alfuria,Fluzone) 12/08/2005, 10/26/2007, 10/21/2015    Influenza Vaccine IM Ages 6-35 Months 4 Valent (PF) 10/21/2015    Influenza Vaccine, 6+MO IM (QUADRIVALENT W/PRESERVATIVES) 10/17/2017, 10/01/2019    TDAP (Adacel,Boostrix) 07/26/2011, 02/28/2022    Td (Adult), Adsorbed 09/23/2003    Zoster recombinant adjuvanted (SHINGRIX) 04/11/2022     Allergies   Allergen Reactions    Other Environmental Allergy      Trees and weeds    Zolpidem      confusion     OBJECTIVE:                                                                 /71   Temp 98  F (36.7  C)   Wt 75 kg (165 lb 5.5 oz)   SpO2 99%   BMI 24.42 kg/m          Physical Exam  Vitals and nursing note reviewed.   Constitutional:       General: She is not in acute distress.     Appearance: She is not ill-appearing, toxic-appearing or diaphoretic.   HENT:      Head: Normocephalic and atraumatic.      Right Ear: Tympanic membrane, ear canal and external ear normal.      Left Ear: Tympanic membrane, ear canal and external ear normal.      Nose: No congestion or rhinorrhea.       Right Turbinates: Not enlarged, swollen or pale.      Left Turbinates: Not enlarged, swollen or pale.      Mouth/Throat:      Lips: Pink.      Mouth: Mucous membranes are moist.      Pharynx: Oropharynx is clear. No pharyngeal swelling, oropharyngeal exudate, posterior oropharyngeal erythema or uvula swelling.   Eyes:      General:         Right eye: No discharge.         Left eye: No discharge.      Conjunctiva/sclera: Conjunctivae normal.   Cardiovascular:      Rate and Rhythm: Normal rate and regular rhythm.      Heart sounds: Normal heart sounds.   Pulmonary:      Effort: Pulmonary effort is normal. No respiratory distress.      Breath sounds: Normal breath sounds and air entry. No stridor, decreased air movement or transmitted upper airway sounds. No decreased breath sounds, wheezing, rhonchi or rales.   Skin:     General: Skin is warm.   Neurological:      Mental Status: She is alert and oriented to person, place, and time.   Psychiatric:         Mood and Affect: Mood normal.         Behavior: Behavior normal.             ASSESSMENT/PLAN:    Other allergy, initial encounter    Considering the appearance of the rash on her pictures and the timing of the rash, the concern for an IgE mediated reaction to shellfish is not very high.  I am unable to perform SPT for aeroallergens or shellfish today because she took diphenhydramine yesterday.  - I ordered serum IgE for shellfish.  - Discussed reasoning between getting a prescription for EpiPen versus not getting it.  The patient feels more comfortable to have it until we have more information.  I did prescribe it.  Anaphylaxis action plan was reviewed and provided.  - For now, she will continue shellfish avoidance.    - IgE  - Allergen clam IgE  - Allergen crab IgE  - Allergen lobster IgE  - Allergen oyster IgE  - Allergen scallop IgE  - Allergen shrimp IgE  - EPINEPHrine (ANY BX GENERIC EQUIV) 0.3 MG/0.3ML injection 2-pack  Dispense: 2 each; Refill: 3      Chronic  vasomotor rhinitis    I ordered serum IgE for regional aeroallergen panel.  -In Fall, seasonally, use intranasal mometasone 1-2 sprays in each nostril once daily.  - Add azelastine nasal spray, 2 sprays in each nostril twice daily as needed.    - mometasone (NASONEX) 50 MCG/ACT nasal spray  Dispense: 17 g; Refill: 3  - azelastine (ASTELIN) 0.1 % nasal spray  Dispense: 30 mL; Refill: 3  - Allergen cat epithellium IgE  - Allergen dog epithelium IgE  - Allergen Isidro grass IgE  - Allergen orchard grass IgE  - Allergen jamal IgE  - Allergen D farinae IgE  - Allergen D pteronyssinus IgE  - Allergen alternaria alternata IgE  - Allergen aspergillus fumigatus IgE  - Allergen cladosporium herbarum IgE  - Allergen Epicoccum purpurascens IgE  - Allergen penicillium notatum IgE  - Allergen richard white IgE  - Allergen Cedar IgE  - Allergen cottonwood IgE  - Allergen elm IgE  - Allergen maple box elder IgE  - Allergen oak white IgE  - Allergen Red [place] IgE  - Allergen silver  birch IgE  - Allergen Tree White [place] IgE  - Allergen Clendenin Tree  - Allergen white pine IgE  - Allergen English plantain IgE  - Allergen giant ragweed IgE  - Allergen lamb's quarter IgE  - Allergen Mugwort IgE  - Allergen ragweed short IgE  - Allergen Sagebrush Wormwood IgE  - Allergen Sheep Sorrel IgE  - Allergen thistle Russian IgE  - Allergen Weed Nettle IgE  - Allergen, Kochia/Firebush        Return in about 3 months (around 10/26/2023), or depending on lab results.    Thank you for allowing us to participate in the care of this patient. Please feel free to contact us if there are any questions or concerns about the patient.    Disclaimer: This note consists of symbols derived from keyboarding, dictation and/or voice recognition software. As a result, there may be errors in the script that have gone undetected. Please consider this when interpreting information found in this chart.    Blake Spencer MD, FAAAAI, FACAAI  Allergy, Asthma and  Immunology     [facility]

## 2023-07-26 NOTE — LETTER
ANAPHYLAXIS ALLERGY PLAN    Name: Colette Nassar      :  1965    Allergy to:  work up for shellfish allergy    Weight: 165 lbs 5.52 oz                 Do not depend on antihistamines or inhalers (bronchodilators) to treat a severe reaction; USE EPINEPHRINE      MEDICATIONS/DOSES  Epinephrine:  EpiPen/Adrenaclick/Auvi-Q   Epinephrine dose:  0.3 mg IM  Antihistamine:  Zyrtec (Cetirizine)  Antihistamine dose:  0.3mg  Other (e.g., inhaler-bronchodilator if wheezing):         ANAPHYLAXIS ALLERGY PLAN (Page 2)  Patient:  Colette Nassar  :  1965         Electronically signed on 2023 by:  Blake Spencer MD  Parent/Guardian Authorization Signature:  ___________________________ Date:    FORM PROVIDED COURTESY OF [employer]OD ALLERGY [employer] & EDUCATION (FARE) ([email or web address]) 2017

## 2023-07-26 NOTE — NURSING NOTE
RN reviewed Anaphylaxis Action Plan with patient. Educated on the symptoms and treatment of anaphylaxis. Went through the different ways that a reaction can present, and the body systems that it can affect. Patient verbalized understanding.     Writer demonstrated how to use an EpiPen auto-injector.  Patient instructed to form a fist around the auto-injector, remove blue safety release by pulling straight up, then firmly push orange tip against outer thigh so it clicks, holding for 3 seconds.  Patient advised that once used, needle will not be exposed, as orange tip extends.  Patient advised to call 911 or go to emergency department after epi-pen use for further monitoring.       Lyssa Ng MSN, RN   [facility], 7/26/2023 2:30 PM

## 2023-07-26 NOTE — PATIENT INSTRUCTIONS
For now continue shellfish avoidance.     Get the bloodwork done.  In Fall:    -Start Nasonex 1-2 sprays in each nostril once daily.  -Add azelastine nasal spray, 2 sprays in each nostril twice daily as needed.    Dr Spencer Scheduling:  [facility] (Tues / Wed) appointment line: [phone]  [place] allergy shot room: [phone]  [facility] (Thurs / Fri) appointment line: [phone]    Pulmonary Function Scheduling:  [place] - [phone]  [facility] [phone]  [place] - [phone]     Prescription Assistance  If you need assistance with your prescriptions (cost, coverage, etc) please contact: [place] [facility] Assistance [facility] [phone]

## 2023-07-26 NOTE — LETTER
7/26/2023         RE: Colette Nassar  [id number] [facility] [address]        Dear Colleague,    Thank you for referring your patient, Colette Nassar, to the [facility]. Please see a copy of my visit note below.    SUBJECTIVE:                                                                   Colette Nassar presents today to our Allergy Clinic at [facility] for a new patient visit. She is a 57-year-old female with concerns about possible shellfish allergy.   Reports a history of seasonal allergies. In September she develops nasal congestion, rhinorrhea, sneezing, postnasal drip, ear itchy, and itchy and watery eyes.   In February, she develops sinus infections as well.     She did not like using intranasal fluticasone. Most of the time, she treats her symptoms with oxymetazoline. Sometimes, she develops rhinitis medicamentosa symptoms, and she needs to take prednisone for that.  She tried Astelin a long time ago. She doesn't remember if it was effective.     She used to eat shellfish once a month without a problem. In February 2023, she developed pruritic beet red skin in the morning. She thinks she developed itchy skin in the evening prior but is not 100% sure. That evening, she was with her mother at [facility], eating either shrimp or crab. The rash persisted for weeks despite taking prednisone and diphenhydramine. She denies having any lip swelling, tongue swelling, or throat closing sensation simultaneously. She has prednisone in [place]. When the rash was getting better, it was somewhat scaly.  She does have a picture of the rash on her neck. It did not appear urticarial. It resembled dermatitis.  The same story happened in May 2023. She is not sure if she ate any shellfish, in between. She has been avoiding shellfish after the second episode.       Patient Active Problem List   Diagnosis     Cervical high risk HPV (human papillomavirus) test  positive     Abnormal weight gain     Acquired absence of bilateral breasts and nipples     Blindness, bilateral     Bruising     Coordination impairment     Elbow subluxation, left     Encounter for health-related screening     Exercise induced bronchospasm     Fatigue     Hypothyroidism     Infection of breast, right     Invasive ductal carcinoma of breast, female, right (H)     Lack of concentration     Left shoulder pain     Memory loss     Menopausal syndrome     Migraine     Mild recurrent major depression (H)     Perimenopause     SLAP lesion of left shoulder     Venous (peripheral) insufficiency     Vitamin D deficiency       Past Medical History:   Diagnosis Date     Breast cancer (H)      Cervical high risk HPV (human papillomavirus) test positive 7/15/2021    11/18/14 NIL 7/15/21 NIL pap, +HR HPV 18. Plan: colposcopy due before 10/15/21     Melanoma of skin (H)       Problem (# of Occurrences) Relation (Name,Age of Onset)    Diabetes (1) Mother          Past Surgical History:   Procedure Laterality Date     C/SECTION, CLASSICAL       Social History     Socioeconomic History     Marital status:      Spouse name: None     Number of children: None     Years of education: None     Highest education level: None   Tobacco Use     Smoking status: Never     Smokeless tobacco: Never   Substance and Sexual Activity     Sexual activity: Yes     Partners: Male     Birth control/protection: Post-menopausal   Social History Narrative    July 26, 2023    ENVIRONMENTAL HISTORY: [place] The home is heated with a forced air. They does have central air conditioning. The patient's bedroom is furnished with carpeting in bedroom, allergen mattress cover, and fabric window coverings.  Pets inside the house include 1 cat(s). There is no history of cockroach or mice infestation. There is/are 0 smokers in the house.  The house does not have a damp basement.            Review of  Systems   Constitutional:  Negative for activity change, chills, fatigue and fever.   HENT:  Positive for sneezing. Negative for congestion, nosebleeds, postnasal drip, rhinorrhea, sinus pressure and sore throat.    Eyes:  Negative for discharge, redness and itching.   Respiratory:  Negative for cough, chest tightness, shortness of breath and wheezing.    Cardiovascular:  Negative for chest pain.   Gastrointestinal:  Negative for abdominal pain, constipation, diarrhea, nausea and vomiting.   Musculoskeletal:  Negative for joint swelling.   Skin:  Negative for rash.   Allergic/Immunologic: Positive for environmental allergies and food allergies.   Neurological:  Positive for headaches. Negative for dizziness and light-headedness.   Hematological:  Negative for adenopathy.   Psychiatric/Behavioral:  Negative for behavioral problems. The patient is not nervous/anxious.            Current Outpatient Medications:      azelastine (ASTELIN) 0.1 % nasal spray, Spray 2 sprays into both nostrils 2 times daily as needed for rhinitis, Disp: 30 mL, Rfl: 3     azelastine (ASTELIN) 0.1 % nasal spray, Spray 2 sprays into both nostrils 2 times daily as needed for rhinitis, Disp: 30 mL, Rfl: 3     EPINEPHrine (ANY BX GENERIC EQUIV) 0.3 MG/0.3ML injection 2-pack, Inject 0.3 mLs (0.3 mg) into the muscle as needed for anaphylaxis May repeat one time in 5-15 minutes if response to initial dose is inadequate., Disp: 2 each, Rfl: 3     mometasone (NASONEX) 50 MCG/ACT nasal spray, Spray 1-2 sprays into both nostrils daily, Disp: 17 g, Rfl: 3     butalbital-aspirin-caffeine-codeine (FIORINAL WITH CODEINE) per capsule, TAKE 1 TO 2 CAPSULES BY MOUTH TWICE DAILY AS NEEDED, Disp: , Rfl:      citalopram (CELEXA) 40 MG tablet, TAKE 1 TABLET(40 MG) BY MOUTH EVERY DAY, Disp: , Rfl:      estradiol (ESTRACE) 0.1 MG/GM vaginal cream, Place 1 g vaginally At Bedtime For 30 nights, then 3 times per week thereafter. Use finger for application., Disp: 42.5  g, Rfl: 3     estradiol (ESTRING) 2 MG vaginal ring, Place 1 each vaginally every 3 months (Patient not taking: Reported on 9/8/2022), Disp: 1 each, Rfl: 4     ketoconazole (NIZORAL) 2 % external shampoo, , Disp: , Rfl:      levothyroxine (SYNTHROID/LEVOTHROID) 100 MCG tablet, TAKE 1 TABLET(100 MCG) BY MOUTH EVERY DAY, Disp: , Rfl:      LORazepam (ATIVAN) 1 MG tablet, At Bedtime, Disp: , Rfl:      multivitamin w/minerals (THERA-VIT-M) tablet, Take 1 tablet by mouth, Disp: , Rfl:      ondansetron (ZOFRAN) 8 MG tablet, Take 8 mg by mouth, Disp: , Rfl:      tretinoin microspheres (RETIN-A MICRO) 0.1 % external gel, APPLY EXTERNALLY TO THE AFFECTED AREA EVERY DAY, Disp: , Rfl:      triamcinolone (KENALOG) 0.1 % external lotion, , Disp: , Rfl:      valACYclovir (VALTREX) 1000 mg tablet, Take 2,000 mg by mouth as needed, Disp: , Rfl:      ZOLMitriptan (ZOMIG-ZMT) 5 MG ODT, DISSOLVE ONE TABLET ON TONGUE AT ONSET OF HEADACHE. MAY REPEAT AFTER TWO HOURS, Disp: , Rfl:   Immunization History   Administered Date(s) Administered     Flu, Unspecified 10/17/2017, 10/01/2019     Influenza (IIV3) PF 10/22/2010, 10/17/2011, 10/05/2012, 10/14/2013, 10/13/2014     Influenza Vaccine >6 months (Alfuria,Fluzone) 12/08/2005, 10/26/2007, 10/21/2015     Influenza Vaccine IM Ages 6-35 Months 4 Valent (PF) 10/21/2015     Influenza Vaccine, 6+MO IM (QUADRIVALENT W/PRESERVATIVES) 10/17/2017, 10/01/2019     TDAP (Adacel,Boostrix) 07/26/2011, 02/28/2022     Td (Adult), Adsorbed 09/23/2003     Zoster recombinant adjuvanted (SHINGRIX) 04/11/2022     Allergies   Allergen Reactions     Other Environmental Allergy      Trees and weeds     Zolpidem      confusion     OBJECTIVE:                                                                 /71   Temp 98  F (36.7  C)   Wt 75 kg (165 lb 5.5 oz)   SpO2 99%   BMI 24.42 kg/m          Physical Exam  Vitals and nursing note reviewed.   Constitutional:       General: She is not in acute distress.      Appearance: She is not ill-appearing, toxic-appearing or diaphoretic.   HENT:      Head: Normocephalic and atraumatic.      Right Ear: Tympanic membrane, ear canal and external ear normal.      Left Ear: Tympanic membrane, ear canal and external ear normal.      Nose: No congestion or rhinorrhea.      Right Turbinates: Not enlarged, swollen or pale.      Left Turbinates: Not enlarged, swollen or pale.      Mouth/Throat:      Lips: Pink.      Mouth: Mucous membranes are moist.      Pharynx: Oropharynx is clear. No pharyngeal swelling, oropharyngeal exudate, posterior oropharyngeal erythema or uvula swelling.   Eyes:      General:         Right eye: No discharge.         Left eye: No discharge.      Conjunctiva/sclera: Conjunctivae normal.   Cardiovascular:      Rate and Rhythm: Normal rate and regular rhythm.      Heart sounds: Normal heart sounds.   Pulmonary:      Effort: Pulmonary effort is normal. No respiratory distress.      Breath sounds: Normal breath sounds and air entry. No stridor, decreased air movement or transmitted upper airway sounds. No decreased breath sounds, wheezing, rhonchi or rales.   Skin:     General: Skin is warm.   Neurological:      Mental Status: She is alert and oriented to person, place, and time.   Psychiatric:         Mood and Affect: Mood normal.         Behavior: Behavior normal.             ASSESSMENT/PLAN:    Other allergy, initial encounter    Considering the appearance of the rash on her pictures and the timing of the rash, the concern for an IgE mediated reaction to shellfish is not very high.  I am unable to perform SPT for aeroallergens or shellfish today because she took diphenhydramine yesterday.  - I ordered serum IgE for shellfish.  - Discussed reasoning between getting a prescription for EpiPen versus not getting it.  The patient feels more comfortable to have it until we have more information.  I did prescribe it.  Anaphylaxis action plan was reviewed and provided.  -  For now, she will continue shellfish avoidance.    - IgE  - Allergen clam IgE  - Allergen crab IgE  - Allergen lobster IgE  - Allergen oyster IgE  - Allergen scallop IgE  - Allergen shrimp IgE  - EPINEPHrine (ANY BX GENERIC EQUIV) 0.3 MG/0.3ML injection 2-pack  Dispense: 2 each; Refill: 3      Chronic vasomotor rhinitis    I ordered serum IgE for regional aeroallergen panel.  -In Fall, seasonally, use intranasal mometasone 1-2 sprays in each nostril once daily.  - Add azelastine nasal spray, 2 sprays in each nostril twice daily as needed.    - mometasone (NASONEX) 50 MCG/ACT nasal spray  Dispense: 17 g; Refill: 3  - azelastine (ASTELIN) 0.1 % nasal spray  Dispense: 30 mL; Refill: 3  - Allergen cat epithellium IgE  - Allergen dog epithelium IgE  - Allergen Isidro grass IgE  - Allergen orchard grass IgE  - Allergen jamal IgE  - Allergen D farinae IgE  - Allergen D pteronyssinus IgE  - Allergen alternaria alternata IgE  - Allergen aspergillus fumigatus IgE  - Allergen cladosporium herbarum IgE  - Allergen Epicoccum purpurascens IgE  - Allergen penicillium notatum IgE  - Allergen richard white IgE  - Allergen Cedar IgE  - Allergen cottonwood IgE  - Allergen elm IgE  - Allergen maple box elder IgE  - Allergen oak white IgE  - Allergen Red [place] IgE  - Allergen silver  birch IgE  - Allergen Tree White [place] IgE  - Allergen [place] Tree  - Allergen white pine IgE  - Allergen English plantain IgE  - Allergen giant ragweed IgE  - Allergen lamb's quarter IgE  - Allergen Mugwort IgE  - Allergen ragweed short IgE  - Allergen Sagebrush Wormwood IgE  - Allergen Sheep Sorrel IgE  - Allergen thistle Russian IgE  - Allergen Weed Nettle IgE  - Allergen, Kochia/Firebush        Return in about 3 months (around 10/26/2023), or depending on lab results.    Thank you for allowing us to participate in the care of this patient. Please feel free to contact us if there are any questions or concerns about the patient.    Disclaimer: This  note consists of symbols derived from keyboarding, dictation and/or voice recognition software. As a result, there may be errors in the script that have gone undetected. Please consider this when interpreting information found in this chart.    Blake Spencer MD, FAAAAI, FACAAI  Allergy, Asthma and Immunology     [facility]        Again, thank you for allowing me to participate in the care of your patient.        Sincerely,        Blake Spencer MD

## 2023-07-27 ENCOUNTER — TELEPHONE (OUTPATIENT)
Dept: ALLERGY | Facility: CLINIC | Age: 58
End: 2023-07-27
Payer: COMMERCIAL

## 2023-07-27 LAB
A ALTERNATA IGE QN: <0.1 KU(A)/L
A FUMIGATUS IGE QN: <0.1 KU(A)/L
C HERBARUM IGE QN: <0.1 KU(A)/L
CALIF WALNUT POLN IGE QN: <0.1 KU(A)/L
CAT DANDER IGG QN: 1.45 KU(A)/L
CEDAR IGE QN: <0.1 KU(A)/L
CLAM IGE QN: <0.1 KU(A)/L
COCKSFOOT IGE QN: 1.77 KU(A)/L
COMMON RAGWEED IGE QN: 0.14 KU(A)/L
COTTONWOOD IGE QN: <0.1 KU(A)/L
CRAB IGE QN: <0.1 KU(A)/L
D FARINAE IGE QN: <0.1 KU(A)/L
D PTERONYSS IGE QN: <0.1 KU(A)/L
DOG DANDER+EPITH IGE QN: <0.1 KU(A)/L
E PURPURASCENS IGE QN: <0.1 KU(A)/L
EAST WHITE PINE IGE QN: <0.1 KU(A)/L
ENGL PLANTAIN IGE QN: <0.1 KU(A)/L
FIREBUSH IGE QN: <0.1 KU(A)/L
GIANT RAGWEED IGE QN: <0.1 KU(A)/L
GOOSEFOOT IGE QN: <0.1 KU(A)/L
IGE SERPL-ACNC: 30 KU/L (ref 0–114)
JOHNSON GRASS IGE QN: 0.69 KU(A)/L
LOBSTER IGE QN: <0.1 KU(A)/L
MAPLE IGE QN: 0.1 KU(A)/L
MUGWORT IGE QN: <0.1 KU(A)/L
NETTLE IGE QN: <0.1 KU(A)/L
OYSTER IGE QN: <0.1 KU(A)/L
P NOTATUM IGE QN: <0.1 KU(A)/L
RED MULBERRY IGE QN: <0.1 KU(A)/L
SALTWORT IGE QN: <0.1 KU(A)/L
SCALLOP IGE QN: <0.1 KU(A)/L
SHEEP SORREL IGE QN: <0.1 KU(A)/L
SHRIMP IGE QN: <0.1 KU(A)/L
SILVER BIRCH IGE QN: 3.25 KU(A)/L
TIMOTHY IGE QN: 1.23 KU(A)/L
WHITE ASH IGE QN: <0.1 KU(A)/L
WHITE ELM IGE QN: <0.1 KU(A)/L
WHITE MULBERRY IGE QN: <0.1 KU(A)/L
WHITE OAK IGE QN: <0.1 KU(A)/L
WORMWOOD IGE QN: <0.1 KU(A)/L

## 2023-07-27 NOTE — TELEPHONE ENCOUNTER
mometasone (NASONEX) 50 MCG/ACT nasal spray       Prior Authorization Retail Medication Request    Medication/Dose: mometasone (NASONEX) 50 MCG/ACT nasal spray  ICD code (if different than what is on RX):    Previously Tried and Failed:    Rationale:      Insurance Name:    Insurance ID:        Pharmacy Information (if different than what is on RX)  Name:    Phone:

## 2023-07-31 NOTE — TELEPHONE ENCOUNTER
Prior Authorization Approval    Authorization Effective Date: 7/1/2023  Authorization Expiration Date: 7/30/2024  Medication: mometasone (NASONEX) 50 MCG/ACT nasal spray  Approved Dose/Quantity:    Reference #:     Insurance Company: [employer] - Phone [phone] Fax [phone]  Expected CoPay:       CoPay Card Available:      Foundation Assistance Needed:    Which Pharmacy is filling the prescription (Not needed for infusion/clinic administered): [id number] - [place], [place] - [address]  Pharmacy Notified: Yes  Patient Notified: Yes  **Instructed pharmacy to notify patient when script is ready to /ship.**

## 2023-07-31 NOTE — TELEPHONE ENCOUNTER
Central Prior Authorization Team   Phone: [phone]    PA Initiation    Medication: mometasone (NASONEX) 50 MCG/ACT nasal spray  Insurance Company: [employer] - Phone [phone] Fax [phone]  Pharm[facility]illing the Rx: [id number] - [place], [place] - [address]  Filling Pharmacy Phone: [phone]  Filling Pharmacy Fax:    Start Date: 7/31/2023

## 2023-08-01 NOTE — RESULT ENCOUNTER NOTE
NanoT[employer] message sent:   Total serum IgE is within normal limits.  Negative serum IgE for shellfish panel.  - I would recommend skin test for shellfish.  Okay to double book.  If the test is negative, consider reintroduction of shellfish into the diet.    Serum IgE for regional aeroallergen panel with sensitivity to cat, grass pollen, and tree pollen (birch).  Mild sensitivity to ragweed.  - Recommend avoidance measures.  - No changes in the previously discussed allergic rhinitis treatment plan, with a combination of Nasonex and azelastine.  If symptoms persist despite medications and allergen avoidance, or if medications are not tolerated, allergen immunotherapy (allergy shots) is recommended.

## 2023-08-23 ENCOUNTER — PATIENT OUTREACH (OUTPATIENT)
Dept: OBGYN | Facility: CLINIC | Age: 58
End: 2023-08-23
Payer: COMMERCIAL

## 2023-08-23 DIAGNOSIS — R87.810 CERVICAL HIGH RISK HPV (HUMAN PAPILLOMAVIRUS) TEST POSITIVE: Primary | ICD-10-CM

## 2023-10-23 NOTE — TELEPHONE ENCOUNTER
FYI to provider - Patient is lost to pap tracking follow-up. Attempts to contact pt have been made per reminder process and there has been no reply and/or no appt scheduled. Contact hx listed below.     11/18/14 NIL  7/15/21 NIL pap, +HR HPV 18. Plan: colposcopy due before 10/15/21  8/19/21 Spiro: bx-neg. Plan: cotest in 1 year  9/8/22 NIL pap, neg HR HPV. Plan 1 year cotest per provider  --  08/23/23 Reminder MyChart   09/20/23 Reminder call - left message  10/23/23 Lost to follow-up for pap tracking       MARVIN ShipleyN, RN

## 2023-10-29 ENCOUNTER — HEALTH MAINTENANCE LETTER (OUTPATIENT)
Age: 58
End: 2023-10-29

## 2024-05-20 ENCOUNTER — OFFICE VISIT (OUTPATIENT)
Dept: OBGYN | Facility: CLINIC | Age: 59
End: 2024-05-20
Payer: COMMERCIAL

## 2024-05-20 VITALS
SYSTOLIC BLOOD PRESSURE: 114 MMHG | WEIGHT: 163 LBS | BODY MASS INDEX: 25.58 KG/M2 | DIASTOLIC BLOOD PRESSURE: 68 MMHG | HEIGHT: 67 IN

## 2024-05-20 DIAGNOSIS — R87.810 CERVICAL HIGH RISK HPV (HUMAN PAPILLOMAVIRUS) TEST POSITIVE: Primary | ICD-10-CM

## 2024-05-20 DIAGNOSIS — C50.911 INVASIVE DUCTAL CARCINOMA OF BREAST, FEMALE, RIGHT (H): ICD-10-CM

## 2024-05-20 DIAGNOSIS — R68.82 LOW LIBIDO: ICD-10-CM

## 2024-05-20 PROCEDURE — 99214 OFFICE O/P EST MOD 30 MIN: CPT | Mod: 25 | Performed by: NURSE PRACTITIONER

## 2024-05-20 PROCEDURE — 86304 IMMUNOASSAY TUMOR CA 125: CPT | Performed by: NURSE PRACTITIONER

## 2024-05-20 PROCEDURE — 84270 ASSAY OF SEX HORMONE GLOBUL: CPT | Performed by: NURSE PRACTITIONER

## 2024-05-20 PROCEDURE — G0123 SCREEN CERV/VAG THIN LAYER: HCPCS | Performed by: NURSE PRACTITIONER

## 2024-05-20 PROCEDURE — 84403 ASSAY OF TOTAL TESTOSTERONE: CPT | Performed by: NURSE PRACTITIONER

## 2024-05-20 PROCEDURE — 36415 COLL VENOUS BLD VENIPUNCTURE: CPT | Performed by: NURSE PRACTITIONER

## 2024-05-20 PROCEDURE — 99459 PELVIC EXAMINATION: CPT | Performed by: NURSE PRACTITIONER

## 2024-05-20 PROCEDURE — 99396 PREV VISIT EST AGE 40-64: CPT | Performed by: NURSE PRACTITIONER

## 2024-05-20 PROCEDURE — 87624 HPV HI-RISK TYP POOLED RSLT: CPT | Performed by: NURSE PRACTITIONER

## 2024-05-20 NOTE — PROGRESS NOTES
Colette is a 58 year old  female who presents for annual exam.     Besides routine health maintenance, she has no other health concerns today .    HPI:  The patient's PCP is Sherlyn Landeros patient here today for her annual GYN exam.  She has no further mammogram screening as she is status post bilateral mastectomy for breast cancer.  She does need a  today.  She did not follow through with her risk reducing hysterectomy last year but we have asked her to complete her ultrasound this year.  She uses her vaginal estradiol cream on a infrequent basis.  She has lack of libido and no interest in intercourse.      GYNECOLOGIC HISTORY:    No LMP recorded. Patient is postmenopausal.    Her current contraception method is: menopause.  She  reports that she has never smoked. She has never used smokeless tobacco.    Patient is sexually active.  STD testing offered?  Declined  Last PHQ-9 score on record =       2022     1:57 PM   PHQ-9 SCORE   PHQ-9 Total Score 0     Last GAD7 score on record =       2022     1:57 PM   AMISHA-7 SCORE   Total Score 2     Alcohol Score =     HEALTH MAINTENANCE:    Pap:   Lab Results   Component Value Date    GYNINTERP  2022     Negative for Intraepithelial Lesion or Malignancy (NILM)    GYNINTERP  07/15/2021     Negative for Intraepithelial Lesion or Malignancy (NILM)        Health maintenance updated:  yes    Care Gaps    Overdue          Never done TSH W/FREE T4 REFLEX (Yearly)     Never done ASTHMA ACTION PLAN (Yearly)     Never done ASTHMA CONTROL TEST (Every 6 Months)     Never done ADVANCE CARE PLANNING (Every 5 Years)     Never done DEPRESSION ACTION PLAN (Once)     Never done GLUCOSE (Every 3 Years)     Never done Pneumococcal Vaccine: Pediatrics (0 to 5 Years) and At-Risk Patients (6 to 64 Years) (1 of 2 - PCV)     Never done COLORECTAL CANCER SCREENING (View topic details)     Never done HIV SCREENING (Once)     Never done HEPATITIS C SCREENING (Once)      Never done HEPATITIS B IMMUNIZATION (1 of 3 - 19+ 3-dose series)     Never done LIPID (Every 5 Years)     2022 ZOSTER IMMUNIZATION (2 of 2)  Last completed: 2022     MAR 8  2023 PHQ-9 (Every 6 Months)  Last completed: Sep 8, 2022     Never done COVID-19 Vaccine ( season)     SEP 8  2023 YEARLY PREVENTIVE VISIT (Yearly)  Last completed: Sep 8, 2022     SEP 8  2023 PAP FOLLOW-UP (Once)  Last completed: Sep 8, 2022     SEP 8  2023 HPV FOLLOW-UP (Once)  Last completed: Sep 8, 2022         Upcoming          SEP 1  2024 INFLUENZA VACCINE (Season Ended)  Last completed: Oct 1, 2019     FEB 28  2032 DTAP/TDAP/TD IMMUNIZATION (3 - Td or Tdap)  Last completed: 2022       HISTORY:  OB History    Para Term  AB Living   3 3 3 0 0 3   SAB IAB Ectopic Multiple Live Births   0 0 0 0 0      # Outcome Date GA Lbr Lazaro/2nd Weight Sex Type Anes PTL Lv   3 Term            2 Term            1 Term                Patient Active Problem List   Diagnosis    Cervical high risk HPV (human papillomavirus) test positive    Abnormal weight gain    Acquired absence of bilateral breasts and nipples    Blindness, bilateral    Bruising    Coordination impairment    Elbow subluxation, left    Encounter for health-related screening    Exercise induced bronchospasm    Fatigue    Hypothyroidism    Infection of breast, right    Invasive ductal carcinoma of breast, female, right (H)    Lack of concentration    Left shoulder pain    Memory loss    Menopausal syndrome    Migraine    Mild recurrent major depression (H24)    Perimenopause    SLAP lesion of left shoulder    Venous (peripheral) insufficiency    Vitamin D deficiency     Past Surgical History:   Procedure Laterality Date    C/SECTION, CLASSICAL        Social History     Tobacco Use    Smoking status: Never    Smokeless tobacco: Never   Substance Use Topics    Alcohol use: Not on file      Problem (# of Occurrences) Relation (Name,Age of Onset)     "Diabetes (1) Mother    Heart Disease (2) Maternal Grandmother, Maternal Grandfather    Breast Cancer (1) Paternal Grandmother    Prostate Cancer (1) Father    Colon Cancer (1) Mother    Brain Cancer (1) Father    Kidney Cancer (1) Father              Current Outpatient Medications   Medication Sig Dispense Refill    citalopram (CELEXA) 40 MG tablet TAKE 1 TABLET(40 MG) BY MOUTH EVERY DAY      estradiol (ESTRACE) 0.1 MG/GM vaginal cream Place 1 g vaginally At Bedtime For 30 nights, then 3 times per week thereafter. Use finger for application. 42.5 g 3    ketoconazole (NIZORAL) 2 % external shampoo       levothyroxine (SYNTHROID/LEVOTHROID) 100 MCG tablet TAKE 1 TABLET(100 MCG) BY MOUTH EVERY DAY      LORazepam (ATIVAN) 1 MG tablet At Bedtime      ondansetron (ZOFRAN) 8 MG tablet Take 8 mg by mouth      tretinoin microspheres (RETIN-A MICRO) 0.1 % external gel APPLY EXTERNALLY TO THE AFFECTED AREA EVERY DAY      triamcinolone (KENALOG) 0.1 % external lotion       valACYclovir (VALTREX) 1000 mg tablet Take 2,000 mg by mouth as needed      ZOLMitriptan (ZOMIG-ZMT) 5 MG ODT DISSOLVE ONE TABLET ON TONGUE AT ONSET OF HEADACHE. MAY REPEAT AFTER TWO HOURS       No current facility-administered medications for this visit.     Allergies   Allergen Reactions    Other Environmental Allergy      Trees and weeds    Zolpidem      confusion       Past medical, surgical, social and family histories were reviewed and updated in EPIC.    ROS:   12 point review of systems negative other than symptoms noted below or in the HPI.  No urinary frequency or dysuria, bladder or kidney problems    EXAM:  /68   Ht 1.704 m (5' 7.1\")   Wt 73.9 kg (163 lb)   BMI 25.45 kg/m     BMI: Body mass index is 25.45 kg/m .    PHYSICAL EXAM:  Constitutional:   Appearance: Well nourished, well developed, alert, in no acute distress  Breasts: Status post bilateral mastectomy  Skin:  General Inspection:  No rashes present, no lesions present, no areas " of  discoloration  Neurologic:    Mental Status:  Oriented X3.  Normal strength and tone, sensory exam                grossly normal, mentation intact and speech normal.    Psychiatric:   Mentation appears normal and affect normal/bright.         Pelvic Exam:  External Genitalia:     Normal appearance for age, no discharge present, no tenderness present, no inflammatory lesions present, color normal  Vagina:     Normal vaginal vault without central or paravaginal defects, ATROPHIC  Bladder:     Nontender to palpation  Urethra:   Urethral Body:  Urethra palpation normal, urethra structural support normal   Urethral Meatus:  No erythema or lesions present  Cervix:     Appearance healthy, no lesions present, nontender to palpation, no bleeding present  Uterus:     Nontender to palpation, no masses present, position anteflexed, mobility: normal  Adnexa:     No adnexal tenderness present, no adnexal masses present  Perineum:     Perineum within normal limits, no evidence of trauma, no rashes or skin lesions present  Inguinal Lymph Nodes:     No lymphadenopathy present    COUNSELING:   Special attention given to:        Regular exercise       Healthy diet/nutrition       Osteoporosis prevention/bone health       ([place])menopause management    BMI: Body mass index is 25.45 kg/m .      ASSESSMENT:  58 year old female with satisfactory annual exam.    ICD-10-CM    1. Cervical high risk HPV (human papillomavirus) test positive  R87.810 Gynecologic Cytology (Pap) and HPV - Recommended Age 30-65 Years      2. Invasive ductal carcinoma of breast, female, right (H)  C50.911      US Transvaginal Pelvic Non-OB           3. Low libido  R68.82 Testosterone Free and Total     Sex Hormone Binding Globulin     Testosterone Free and Total     Sex Hormone Binding Globulin          PLAN:  58-year-old postmenopausal female with a normal postmenopausal GYN exam.  She actually has good vaginal tissue and we have recommended that  she continue using her estradiol cream at least twice a week and that should be sufficient for her.  We will check a testosterone today.  Pap smear was collected and if it is normal she can repeat in 1 year.  Will check a  and have asked her to complete a transvaginal ultrasound and we will further refer for a risk reducing hysterectomy.    STEPHAN Cole CNP

## 2024-05-21 LAB
CANCER AG125 SERPL-ACNC: 16 U/ML
SHBG SERPL-SCNC: 55 NMOL/L (ref 30–135)
SHBG SERPL-SCNC: 55 NMOL/L (ref 30–135)

## 2024-05-22 LAB
HPV HR 12 DNA CVX QL NAA+PROBE: NEGATIVE
HPV16 DNA CVX QL NAA+PROBE: NEGATIVE
HPV18 DNA CVX QL NAA+PROBE: NEGATIVE
HUMAN PAPILLOMA VIRUS FINAL DIAGNOSIS: NORMAL

## 2024-05-23 ENCOUNTER — MYC MEDICAL ADVICE (OUTPATIENT)
Dept: OBGYN | Facility: CLINIC | Age: 59
End: 2024-05-23
Payer: COMMERCIAL

## 2024-05-23 DIAGNOSIS — R68.82 LOW LIBIDO: Primary | ICD-10-CM

## 2024-05-23 LAB
TESTOST FREE SERPL-MCNC: 0.13 NG/DL
TESTOST SERPL-MCNC: 10 NG/DL (ref 8–60)

## 2024-05-28 LAB
BKR LAB AP GYN ADEQUACY: NORMAL
BKR LAB AP GYN INTERPRETATION: NORMAL
BKR LAB AP PREVIOUS ABNL DX: NORMAL
BKR LAB AP PREVIOUS ABNORMAL: NORMAL
PATH REPORT.COMMENTS IMP SPEC: NORMAL
PATH REPORT.COMMENTS IMP SPEC: NORMAL
PATH REPORT.RELEVANT HX SPEC: NORMAL

## 2024-05-29 PROBLEM — R87.810 CERVICAL HIGH RISK HPV (HUMAN PAPILLOMAVIRUS) TEST POSITIVE: Status: ACTIVE | Noted: 2021-07-15

## 2024-06-05 ENCOUNTER — VIRTUAL VISIT (OUTPATIENT)
Dept: OBGYN | Facility: CLINIC | Age: 59
End: 2024-06-05
Attending: NURSE PRACTITIONER
Payer: COMMERCIAL

## 2024-06-05 ENCOUNTER — ANCILLARY PROCEDURE (OUTPATIENT)
Dept: ULTRASOUND IMAGING | Facility: CLINIC | Age: 59
End: 2024-06-05
Attending: NURSE PRACTITIONER
Payer: COMMERCIAL

## 2024-06-05 DIAGNOSIS — N95.1 MENOPAUSAL SYNDROME: Primary | ICD-10-CM

## 2024-06-05 DIAGNOSIS — C50.911 INVASIVE DUCTAL CARCINOMA OF BREAST, FEMALE, RIGHT (H): ICD-10-CM

## 2024-06-05 PROCEDURE — 76830 TRANSVAGINAL US NON-OB: CPT | Performed by: OBSTETRICS & GYNECOLOGY

## 2024-06-05 PROCEDURE — 99207 PR NO BILLABLE SERVICE THIS VISIT: CPT | Mod: 93 | Performed by: NURSE PRACTITIONER

## 2024-06-05 NOTE — PROGRESS NOTES
Colette Nassar is a 58 year old female who is being evaluated via a patient initiated billable telephone visit.     What phone number would you like to be contacted at? [phone]  How would you like to obtain your AVS? Giuliana      Originating Location (pt. Location): home      Distant Location (provider location):  On-site      SUBJECTIVE:                                                   Colette Nassar is a 58 year old female who presents for virtual visit today for the following health issue(s):  Patient presents with:  Follow Up: Follow up US           HPI:  Phone call with patient regarding ultrasound.     S/p mastectomy. Interested in risk reducing hysterectomy.     No vaginal bleeding.       No LMP recorded. Patient is postmenopausal..     Patient is sexually active, .  Using menopause for contraception.    reports that she has never smoked. She has never used smokeless tobacco.      Health maintenance updated:  yes    Today's PHQ-2 Score:       7/15/2021     3:08 PM   PHQ-2 (  [employer])   Q1: Little interest or pleasure in doing things 0   Q2: Feeling down, depressed or hopeless 0   PHQ-2 Score 0   PHQ-2 Total Score (12-17 Years)- Positive if 3 or more points; Administer PHQ-A if positive 0     Today's PHQ-9 Score:       2022     1:57 PM   PHQ-9 SCORE   PHQ-9 Total Score 0     Today's AMISHA-7 Score:       2022     1:57 PM   AMISHA-7 SCORE   Total Score 2       Problem list and histories reviewed & adjusted, as indicated.  Additional history: as documented.    Patient Active Problem List   Diagnosis    Cervical high risk HPV (human papillomavirus) test positive    Abnormal weight gain    Acquired absence of bilateral breasts and nipples    Blindness, bilateral    Bruising    Coordination impairment    Elbow subluxation, left    Encounter for health-related screening    Exercise induced bronchospasm    Fatigue    Hypothyroidism    Infection of breast, right    Invasive ductal carcinoma of  breast, female, right (H)    Lack of concentration    Left shoulder pain    Memory loss    Menopausal syndrome    Migraine    Mild recurrent major depression (H24)    Perimenopause    SLAP lesion of left shoulder    Venous (peripheral) insufficiency    Vitamin D deficiency     Past Surgical History:   Procedure Laterality Date    C/SECTION, CLASSICAL        Social History     Tobacco Use    Smoking status: Never    Smokeless tobacco: Never   Substance Use Topics    Alcohol use: Not on file      Problem (# of Occurrences) Relation (Name,Age of Onset)    Diabetes (1) Mother    Heart Disease (2) Maternal Grandmother, Maternal Grandfather    Breast Cancer (1) Paternal Grandmother    Prostate Cancer (1) Father    Colon Cancer (1) Mother    Brain Cancer (1) Father    Kidney Cancer (1) Father              Current Outpatient Medications   Medication Sig Dispense Refill    citalopram (CELEXA) 40 MG tablet TAKE 1 TABLET(40 MG) BY MOUTH EVERY DAY      COMPOUNDED NON-CONTROLLED SUBSTANCE (CMPD RX) - PHARMACY TO MIX COMPOUNDED MEDICATION Testosterone 10mg/gram. 1 click daily to thin skinned area. 60 g 3    estradiol (ESTRACE) 0.1 MG/GM vaginal cream Place 1 g vaginally At Bedtime For 30 nights, then 3 times per week thereafter. Use finger for application. 42.5 g 3    ketoconazole (NIZORAL) 2 % external shampoo       levothyroxine (SYNTHROID/LEVOTHROID) 100 MCG tablet TAKE 1 TABLET(100 MCG) BY MOUTH EVERY DAY      LORazepam (ATIVAN) 1 MG tablet At Bedtime      ondansetron (ZOFRAN) 8 MG tablet Take 8 mg by mouth      tretinoin microspheres (RETIN-A MICRO) 0.1 % external gel APPLY EXTERNALLY TO THE AFFECTED AREA EVERY DAY      triamcinolone (KENALOG) 0.1 % external lotion       valACYclovir (VALTREX) 1000 mg tablet Take 2,000 mg by mouth as needed      ZOLMitriptan (ZOMIG-ZMT) 5 MG ODT DISSOLVE ONE TABLET ON TONGUE AT ONSET OF HEADACHE. MAY REPEAT AFTER TWO HOURS       No current facility-administered medications for this visit.      Allergies   Allergen Reactions    Other Environmental Allergy      Trees and weeds    Zolpidem      confusion         OBJECTIVE:     No vitals were obtained today due to virtual telephone visit.    Physical Exam  GENERAL: alert and no distress  EYES: Eyes grossly normal to inspection.  No discharge or erythema, or obvious scleral/conjunctival abnormalities.  RESP: No audible wheeze, cough, or visible cyanosis.    SKIN: Visible skin clear. No significant rash, abnormal pigmentation or lesions.  NEURO: Cranial nerves grossly intact.  Mentation and speech appropriate for age.  PSYCH: Appropriate affect, tone, and pace of words  Results for orders placed or performed in visit on 06/05/24   US Transvaginal Pelvic Non-OB     Status: None    Narrative    Table formatting from the original result was not included.     US Transvaginal Pelvic Non-OB  Order #: [id number] Accession #: [id number]  Study Notes     Spurling, Brittnee on 6/5/2024  2:54 PM      Gynecological Ultrasound Report  Pelvic U/S - Transvaginal  [facility]  Referring Provider: Lyssa Cleveland CNP   Sonographer:  Brittnee Spurling, RDMS  Indication: Breast cancer  LMP: No LMP recorded. Patient is postmenopausal.  History: status post bilateral mastectomy  Gynecological Ultrasonography:   Uterus: anteverted. Contour is smooth/regular.  Size: 5.5 x 4.1 x 2.9 cm  Endometrium: Thickness Total 2.6 mm  Findings: small amount of fluid within endometrium  Right Ovary: 1.6 x 0.8 x 0.8 cm. Wnl  Left Ovary: Not visualized due to overlying bowel  Cul de Sac Free Fluid: No free fluid  Technique: Transvaginal Imaging performed     Impression:       Anteverted uterus.  Thin endometrium, with small amount of fluid within the endometrium  Right ovary appears normal  Left ovary not visualized due to overlying gas.    Joceline Chapman MD           ASSESSMENT/PLAN:                                                      Phone call duration: 4 minutes       ICD-10-CM    1. Menopausal syndrome  N95.1           There are no Patient Instructions on file for this visit.    Normal ultrasound.   Open to schedule hysterectomy consult when/if she wishes.     Is to call with any vaginal bleeding.     This is a no charge visit.     STEPHAN Cole CNP  [facility] [place]

## 2024-11-06 ENCOUNTER — TRANSCRIBE ORDERS (OUTPATIENT)
Dept: OTHER | Age: 59
End: 2024-11-06

## 2024-11-06 DIAGNOSIS — M25.552 BILATERAL HIP PAIN: Primary | ICD-10-CM

## 2024-11-06 DIAGNOSIS — M25.551 BILATERAL HIP PAIN: Primary | ICD-10-CM

## 2024-12-09 ENCOUNTER — THERAPY VISIT (OUTPATIENT)
Dept: PHYSICAL THERAPY | Facility: CLINIC | Age: 59
End: 2024-12-09
Payer: COMMERCIAL

## 2024-12-09 DIAGNOSIS — M25.551 BILATERAL HIP PAIN: Primary | ICD-10-CM

## 2024-12-09 DIAGNOSIS — M25.552 BILATERAL HIP PAIN: Primary | ICD-10-CM

## 2024-12-09 PROCEDURE — 97161 PT EVAL LOW COMPLEX 20 MIN: CPT | Mod: GP | Performed by: PHYSICAL THERAPIST

## 2024-12-09 PROCEDURE — 97110 THERAPEUTIC EXERCISES: CPT | Mod: GP | Performed by: PHYSICAL THERAPIST

## 2024-12-09 ASSESSMENT — ACTIVITIES OF DAILY LIVING (ADL)
WALKING_15_MINUTES_OR_GREATER: MODERATE DIFFICULTY
GETTING_INTO_AND_OUT_OF_AN_AVERAGE_CAR: MODERATE DIFFICULTY
HOS_ADL_HIGHEST_POTENTIAL_SCORE: 68
WALKING_DOWN_STEEP_HILLS: SLIGHT DIFFICULTY
ADL_TOTAL_ITEM_SCORE: 0
STEPPING_UP_AND_DOWN_CURBS: MODERATE DIFFICULTY
WALKING_FOR_APPROXIMATELY_10_MINUTES: MODERATE DIFFICULTY
PUTTING ON SOCKS AND SHOES: MODERATE DIFFICULTY
HOW_WOULD_YOU_RATE_YOUR_CURRENT_LEVEL_OF_FUNCTION?: SEVERELY ABNORMAL
GOING_DOWN_1_FLIGHT_OF_STAIRS: SLIGHT DIFFICULTY
SITTING FOR 15 MINUTES: SLIGHT DIFFICULTY
GETTING INTO AND OUT OF AN AVERAGE CAR: MODERATE DIFFICULTY
STEPPING UP AND DOWN CURBS: MODERATE DIFFICULTY
DEEP_SQUATTING: UNABLE TO DO
LOW_IMPACT_ACTIVITIES_LIKE_FAST_WALKING: EXTREME DIFFICULTY
GOING DOWN 1 FLIGHT OF STAIRS: SLIGHT DIFFICULTY
SITTING_FOR_15_MINUTES: SLIGHT DIFFICULTY
SPORTS_HIGHEST_POTENTIAL_SCORE: 36
GOING_UP_1_FLIGHT_OF_STAIRS: MODERATE DIFFICULTY
ADL_COUNT: 17
WALKING_15_MINUTES_OR_GREATER: MODERATE DIFFICULTY
PUTTING_ON_SOCKS_AND_SHOES: MODERATE DIFFICULTY
LIGHT_TO_MODERATE_WORK: MODERATE DIFFICULTY
PLEASE_INDICATE_YOR_PRIMARY_REASON_FOR_REFERRAL_TO_THERAPY:: HIP
STANDING FOR 15 MINUTES: MODERATE DIFFICULTY
HOS_ADL_SCORE(%): 45.59
RUNNING_ONE_MILE: UNABLE TO DO
WALKING_INITIALLY: EXTREME DIFFICULTY
HOW_WOULD_YOU_RATE_YOUR_CURRENT_LEVEL_OF_FUNCTION_DURING_YOUR_USUAL_ACTIVITIES_OF_DAILY_LIVING_FROM_0_TO_100_WITH_100_BEING_YOUR_LEVEL_OF_FUNCTION_PRIOR_TO_YOUR_HIP_PROBLEM_AND_0_BEING_THE_INABILITY_TO_PERFORM_ANY_OF_YOUR_USUAL_DAILY_ACTIVITIES?: 35
SPORTS_TOTAL_ITEM_SCORE: 0
ROLLING OVER IN BED: SLIGHT DIFFICULTY
HOW_WOULD_YOU_RATE_YOUR_CURRENT_LEVEL_OF_FUNCTION_DURING_YOUR_USUAL_ACTIVITIES_OF_DAILY_LIVING_FROM_0_TO_100_WITH_100_BEING_YOUR_LEVEL_OF_FUNCTION_PRIOR_TO_YOUR_HIP_PROBLEM_AND_0_BEING_THE_INABILITY_TO_PERFORM_ANY_OF_YOUR_USUAL_DAILY_ACTIVITIES?: 35
SWINGING_OBJECTS_LIKE_A_GOLF_CLUB: SLIGHT DIFFICULTY
ADL_HIGHEST_POTENTIAL_SCORE: 68
WALKING_APPROXIMATELY_10_MINUTES: MODERATE DIFFICULTY
DEEP SQUATTING: UNABLE TO DO
ABILITY_TO_PARTICIPATE_IN_YOUR_DESIRED_SPORT_AS_LONG_AS_YOU_WOULD_LIKE: UNABLE TO DO
WALKING_DOWN_STEEP_HILLS: SLIGHT DIFFICULTY
GETTING_INTO_AND_OUT_OF_A_BATHTUB: MODERATE DIFFICULTY
ADL_SCORE(%): 0
LIGHT_TO_MODERATE_WORK: MODERATE DIFFICULTY
WALKING_UP_STEEP_HILLS: EXTREME DIFFICULTY
HOW_WOULD_YOU_RATE_YOUR_CURRENT_LEVEL_OF_FUNCTION_DURING_YOUR_SPORTS_RELATED_ACTIVITIES_FROM_0_TO_100_WITH_100_BEING_YOUR_LEVEL_OF_FUNCTION_PRIOR_TO_YOUR_HIP_PROBLEM_AND_0_BEING_THE_INABILITY_TO_PERFORM_ANY_OF_YOUR_USUAL_DAILY_ACTIVITIES?: 0
ABILITY_TO_PERFORM_ACTIVITY_WITH_YOUR_NORMAL_TECHNIQUE: EXTREME DIFFICULTY
STANDING_FOR_15_MINUTES: MODERATE DIFFICULTY
WALKING_INITIALLY: EXTREME DIFFICULTY
SPORTS_COUNT: 9
JUMPING: UNABLE TO DO
HOS_ADL_ITEM_SCORE_TOTAL: 31
TWISTING/PIVOTING_ON_INVOLVED_LEG: MODERATE DIFFICULTY
HEAVY_WORK: EXTREME DIFFICULTY
CUTTING/LATERAL_MOVEMENTS: SLIGHT DIFFICULTY
RECREATIONAL_ACTIVITIES: EXTREME DIFFICULTY
GETTING_INTO_AND_OUT_OF_A_BATHTUB: MODERATE DIFFICULTY
HEAVY_WORK: EXTREME DIFFICULTY
TWISTING/PIVOTING ON INVOLVED LEG: MODERATE DIFFICULTY
WALKING_UP_STEEP_HILLS: EXTREME DIFFICULTY
SPORTS_SCORE(%): 0
GOING UP 1 FLIGHT OF STAIRS: MODERATE DIFFICULTY
ROLLING_OVER_IN_BED: SLIGHT DIFFICULTY
RECREATIONAL ACTIVITIES: EXTREME DIFFICULTY

## 2024-12-09 NOTE — PROGRESS NOTES
"PHYSICAL THERAPY EVALUATION  Type of Visit: Evaluation        Fall Risk Screen:  Fall screen completed by: PT  Have you fallen 2 or more times in the past year?: No  Have you fallen and had an injury in the past year?: No  Is patient a fall risk?: No    Subjective         Presenting condition or subjective complaint: (Patient-Rptd) Extreme Left Hip Pain. Told it was a tendon issue  Date of onset:    Pt reports onset L hip pain dating back to prior to her breast cancer dx in . That resolved until about a year and half ago when it recurred and was both hips and has been managing with injections. The L hip has persisted. Pt reports that she had \"burning nerves\" procedure in the back about two months ago (likely rhizotomy). It didn't do much for pain. Saw primary care provider on 24 and was referred to PT.     Xray:   No acute fractures or dislocations.  Hip joints are normal. Postsurgical changes of bilateral tubal ligation. The femoral heads are seated within the acetabulum. Only mild degenerative changes bilaterally.    MRIs: R avascular necrosis, glue med insertionnal tendniopathy, DJD, lumbar spondylosis.   L avascular necrosis, glute med tendinopathy, DJD.     Lumbar multilvel foraminal narrowing, facet arthrosis, spodylolysis      Relevant medical history: (Patient-Rptd) Cancer; Menopause; Migraines or headaches; Radiation treatment; Severe headaches; Thyroid problems; Vision problems   Dates & types of surgery: (Patient-Rptd)   ,  Double Mastectomy, 2016 [facility] Flap Surgery    Prior diagnostic imaging/testing results: (Patient-Rptd) MRI; X-ray; Bone scan     Prior therapy history for the same diagnosis, illness or injury: (Patient-Rptd) No      Prior Level of Function  Transfers:   Ambulation:   ADL:   IADL:     Living Environment  Social support: (Patient-Rptd) With a significant other or spouse   Type of home: (Patient-Rptd) [facility]   Stairs to enter the home: (Patient-Rptd) Yes " "(Patient-Rptd) 17 Is there a railing: (Patient-Rptd) Yes     Ramp: (Patient-Rptd) No   Stairs inside the home: (Patient-Rptd) No       Help at home: (Patient-Rptd) None  Equipment owned:       Employment: (Patient-Rptd) Yes (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  Be able to sleep on my sides.  Not feel like I'm 89    Pain assessment: See objective evaluation for additional pain details     Objective   LUMBAR SPINE EVALUATION  PAIN: Pain Level at Rest: 0/10  Pain Level with Use: 9/10  Pain Location: L buttock \"deep\", sometimes central low back  Pain Quality: Aching and Sharp  Pain Frequency: intermittent  Pain is Worst: activity related  Pain is Exacerbated By: lying on either side, sitting too long, the starting of walking after sitting in car, walking too long  Pain is Relieved By: maybe gabapentin  Pain Progression: Worsened  INTEGUMENTARY (edema, incisions):   POSTURE: Standing Posture: Lordosis decreased, R lower thoracic convex scoliosis  Sitting Posture: Lordosis decreased  GAIT:   Weightbearing Status:   Assistive Device(s):   Gait Deviations: Trunk lean R  BALANCE/PROPRIOCEPTION:   WEIGHTBEARING ALIGNMENT:   NON-WEIGHTBEARING ALIGNMENT:    ROM:   (Degrees) Left AROM Left PROM  Right AROM Right PROM   Hip Flexion  nil  nil   Hip Extension  Min-mod loss  Min loss   Hip Abduction       Hip Adduction       Hip Internal Rotation  nil  nil   Hip External Rotation  nil  nil   Knee Flexion       Knee Extension       Lumbar Side glide nil Nil with \"stretch\" L lateral hip   Lumbar Flexion nil   Lumbar Extension Nil to min loss   Pain:   End feel:   PELVIC/SI SCREEN:   STRENGTH:     MYOTOMES:    Left Right   T12-L3 (Hip Flexion) 4+ 5   L2-4 (Quads)  5 5   L4 (Ankle DF) 5 5   L5 (Great Toe Ext) 5 5   S1 (Toe Raise) 5 5     Hip specific MMT:    R hip abd 4+/5  L hip abd 5-/5    L hip IR " produces L posterior hip pain  DTR S:   CORD SIGNS:   DERMATOMES:   NEURAL TENSION:   FLEXIBILITY:   LUMBAR/HIP Special Tests:    PELVIS/SI SPECIAL TESTS:   FUNCTIONAL TESTS:   PALPATION: WNL  SPINAL SEGMENTAL CONCLUSIONS:       Assessment & Plan   CLINICAL IMPRESSIONS  Medical Diagnosis: Bilateral hip pain    Treatment Diagnosis: L>R hip pain, DJD with avascular necrosis   Impression/Assessment: Patient is a 59 year old female with L>R hip pain, LBP complaints.  The following significant findings have been identified: Pain, Decreased ROM/flexibility, Decreased joint mobility, Decreased strength, Inflammation, Impaired gait, Decreased activity tolerance, and Impaired posture. These impairments interfere with their ability to perform self care tasks, work tasks, recreational activities, household chores, household mobility, and community mobility as compared to previous level of function.     Clinical Decision Making (Complexity):  Clinical Presentation: Evolving/Changing  Clinical Presentation Rationale: based on medical and personal factors listed in PT evaluation  Clinical Decision Making (Complexity): Low complexity    PLAN OF CARE  Treatment Interventions:  Interventions: Manual Therapy, Neuromuscular Re-education, Therapeutic Activity, Therapeutic Exercise    Long Term Goals     PT Goal 1  Goal Identifier: sitting  Goal Description: Pt will be able to sit for 2 hours without increased pain  Rationale: to maximize safety and independence with performance of ADLs and functional tasks;to maximize safety and independence within the home;to maximize safety and independence with self cares;to maximize safety and independence with transportation;to maximize safety and independence within the community  Target Date: 02/03/25      Frequency of Treatment: 1x/wk  Duration of Treatment: 8 wks    Recommended Referrals to Other Professionals:   Education Assessment:        Risks and benefits of evaluation/treatment have been  explained.   Patient/Family/caregiver agrees with Plan of Care.     Evaluation Time:     PT Ben, Low Complexity Minutes (31616): 30       Signing Clinician: Sebastian Ceja PT

## 2024-12-12 ENCOUNTER — THERAPY VISIT (OUTPATIENT)
Dept: PHYSICAL THERAPY | Facility: CLINIC | Age: 59
End: 2024-12-12
Payer: COMMERCIAL

## 2024-12-12 DIAGNOSIS — M25.552 BILATERAL HIP PAIN: Primary | ICD-10-CM

## 2024-12-12 DIAGNOSIS — M25.551 BILATERAL HIP PAIN: Primary | ICD-10-CM

## 2025-01-06 ENCOUNTER — THERAPY VISIT (OUTPATIENT)
Dept: PHYSICAL THERAPY | Facility: CLINIC | Age: 60
End: 2025-01-06
Payer: COMMERCIAL

## 2025-01-06 DIAGNOSIS — M25.552 BILATERAL HIP PAIN: Primary | ICD-10-CM

## 2025-01-06 DIAGNOSIS — M25.551 BILATERAL HIP PAIN: Primary | ICD-10-CM

## 2025-01-06 PROCEDURE — 97530 THERAPEUTIC ACTIVITIES: CPT | Mod: GP | Performed by: PHYSICAL THERAPIST

## 2025-01-06 PROCEDURE — 97140 MANUAL THERAPY 1/> REGIONS: CPT | Mod: GP | Performed by: PHYSICAL THERAPIST

## 2025-01-06 PROCEDURE — 97110 THERAPEUTIC EXERCISES: CPT | Mod: GP | Performed by: PHYSICAL THERAPIST

## 2025-02-10 PROBLEM — M25.552 BILATERAL HIP PAIN: Status: RESOLVED | Noted: 2024-12-09 | Resolved: 2025-02-10

## 2025-02-10 PROBLEM — M25.551 BILATERAL HIP PAIN: Status: RESOLVED | Noted: 2024-12-09 | Resolved: 2025-02-10

## 2025-03-25 ENCOUNTER — TRANSCRIBE ORDERS (OUTPATIENT)
Dept: OTHER | Age: 60
End: 2025-03-25

## 2025-03-25 DIAGNOSIS — L65.9 HAIR LOSS: Primary | ICD-10-CM

## 2025-07-05 ENCOUNTER — HEALTH MAINTENANCE LETTER (OUTPATIENT)
Age: 60
End: 2025-07-05

## 2025-08-07 DIAGNOSIS — N95.2 VAGINAL ATROPHY: ICD-10-CM

## 2025-08-07 RX ORDER — ESTRADIOL 0.1 MG/G
CREAM VAGINAL
Qty: 42.5 G | Refills: 3 | OUTPATIENT
Start: 2025-08-07